# Patient Record
Sex: FEMALE | Race: WHITE
[De-identification: names, ages, dates, MRNs, and addresses within clinical notes are randomized per-mention and may not be internally consistent; named-entity substitution may affect disease eponyms.]

---

## 2019-08-10 ENCOUNTER — HOSPITAL ENCOUNTER (EMERGENCY)
Dept: HOSPITAL 75 - ER FS | Age: 74
Discharge: NURSING, REHAB OR LONG-TERM CARE | End: 2019-08-10
Payer: MEDICARE

## 2019-08-10 VITALS — WEIGHT: 225 LBS | HEIGHT: 61 IN | BODY MASS INDEX: 42.48 KG/M2

## 2019-08-10 VITALS — SYSTOLIC BLOOD PRESSURE: 189 MMHG | DIASTOLIC BLOOD PRESSURE: 59 MMHG

## 2019-08-10 DIAGNOSIS — I50.9: Primary | ICD-10-CM

## 2019-08-10 DIAGNOSIS — Z79.82: ICD-10-CM

## 2019-08-10 DIAGNOSIS — R09.02: ICD-10-CM

## 2019-08-10 DIAGNOSIS — Z88.8: ICD-10-CM

## 2019-08-10 DIAGNOSIS — Z79.02: ICD-10-CM

## 2019-08-10 DIAGNOSIS — F03.90: ICD-10-CM

## 2019-08-10 DIAGNOSIS — Z79.4: ICD-10-CM

## 2019-08-10 LAB
ALBUMIN SERPL-MCNC: 3.9 GM/DL (ref 3.2–4.5)
ALP SERPL-CCNC: 226 U/L (ref 40–136)
ALT SERPL-CCNC: 21 U/L (ref 0–55)
APTT BLD: 26 SEC (ref 24–35)
APTT PPP: (no result) S
BACTERIA #/AREA URNS HPF: (no result) /HPF
BASOPHILS # BLD AUTO: 0 10^3/UL (ref 0–0.1)
BASOPHILS NFR BLD AUTO: 0 % (ref 0–10)
BILIRUB SERPL-MCNC: 1.1 MG/DL (ref 0.1–1)
BILIRUB UR QL STRIP: NEGATIVE
BUN/CREAT SERPL: 18
CALCIUM SERPL-MCNC: 9.3 MG/DL (ref 8.5–10.1)
CHLORIDE SERPL-SCNC: 102 MMOL/L (ref 98–107)
CO2 SERPL-SCNC: 27 MMOL/L (ref 21–32)
CREAT SERPL-MCNC: 0.96 MG/DL (ref 0.6–1.3)
EOSINOPHIL # BLD AUTO: 0.2 10^3/UL (ref 0–0.3)
EOSINOPHIL NFR BLD AUTO: 3 % (ref 0–10)
ERYTHROCYTE [DISTWIDTH] IN BLOOD BY AUTOMATED COUNT: 13.2 % (ref 10–14.5)
FIBRINOGEN PPP-MCNC: (no result) MG/DL
GFR SERPLBLD BASED ON 1.73 SQ M-ARVRAT: 57 ML/MIN
GLUCOSE SERPL-MCNC: 176 MG/DL (ref 70–105)
GLUCOSE UR STRIP-MCNC: NEGATIVE MG/DL
HCT VFR BLD CALC: 46 % (ref 35–52)
HGB BLD-MCNC: 14.7 G/DL (ref 11.5–16)
INR PPP: 1.1 (ref 0.8–1.4)
KETONES UR QL STRIP: NEGATIVE
LEUKOCYTE ESTERASE UR QL STRIP: (no result)
LIPASE SERPL-CCNC: 18 U/L (ref 8–78)
LYMPHOCYTES # BLD AUTO: 1.5 X 10^3 (ref 1–4)
LYMPHOCYTES NFR BLD AUTO: 18 % (ref 12–44)
MAGNESIUM SERPL-MCNC: 1.8 MG/DL (ref 1.8–2.4)
MANUAL DIFFERENTIAL PERFORMED BLD QL: NO
MCH RBC QN AUTO: 31 PG (ref 25–34)
MCHC RBC AUTO-ENTMCNC: 32 G/DL (ref 32–36)
MCV RBC AUTO: 96 FL (ref 80–99)
MONOCYTES # BLD AUTO: 0.4 X 10^3 (ref 0–1)
MONOCYTES NFR BLD AUTO: 4 % (ref 0–12)
NEUTROPHILS # BLD AUTO: 6.5 X 10^3 (ref 1.8–7.8)
NEUTROPHILS NFR BLD AUTO: 75 % (ref 42–75)
NITRITE UR QL STRIP: NEGATIVE
PH UR STRIP: 7.5 [PH] (ref 5–9)
PLATELET # BLD: 272 10^3/UL (ref 130–400)
PMV BLD AUTO: 8.9 FL (ref 7.4–10.4)
POTASSIUM SERPL-SCNC: 4.6 MMOL/L (ref 3.6–5)
PROT SERPL-MCNC: 7.3 GM/DL (ref 6.4–8.2)
PROT UR QL STRIP: (no result)
PROTHROMBIN TIME: 14.6 SEC (ref 12.2–14.7)
RBC #/AREA URNS HPF: (no result) /HPF
SODIUM SERPL-SCNC: 143 MMOL/L (ref 135–145)
SP GR UR STRIP: 1.01 (ref 1.02–1.02)
UROBILINOGEN UR-MCNC: 0.2 MG/DL
WBC # BLD AUTO: 8.7 10^3/UL (ref 4.3–11)

## 2019-08-10 PROCEDURE — 85610 PROTHROMBIN TIME: CPT

## 2019-08-10 PROCEDURE — 83874 ASSAY OF MYOGLOBIN: CPT

## 2019-08-10 PROCEDURE — 87088 URINE BACTERIA CULTURE: CPT

## 2019-08-10 PROCEDURE — 87077 CULTURE AEROBIC IDENTIFY: CPT

## 2019-08-10 PROCEDURE — 87186 SC STD MICRODIL/AGAR DIL: CPT

## 2019-08-10 PROCEDURE — 85025 COMPLETE CBC W/AUTO DIFF WBC: CPT

## 2019-08-10 PROCEDURE — 81000 URINALYSIS NONAUTO W/SCOPE: CPT

## 2019-08-10 PROCEDURE — 80053 COMPREHEN METABOLIC PANEL: CPT

## 2019-08-10 PROCEDURE — 83690 ASSAY OF LIPASE: CPT

## 2019-08-10 PROCEDURE — 84484 ASSAY OF TROPONIN QUANT: CPT

## 2019-08-10 PROCEDURE — 83880 ASSAY OF NATRIURETIC PEPTIDE: CPT

## 2019-08-10 PROCEDURE — 93005 ELECTROCARDIOGRAM TRACING: CPT

## 2019-08-10 PROCEDURE — 71045 X-RAY EXAM CHEST 1 VIEW: CPT

## 2019-08-10 PROCEDURE — 36415 COLL VENOUS BLD VENIPUNCTURE: CPT

## 2019-08-10 PROCEDURE — 85730 THROMBOPLASTIN TIME PARTIAL: CPT

## 2019-08-10 PROCEDURE — 93041 RHYTHM ECG TRACING: CPT

## 2019-08-10 PROCEDURE — 83735 ASSAY OF MAGNESIUM: CPT

## 2019-08-10 NOTE — NUR
Pt up to W/C after use of BSC to void. Pt was also incont of urine before 
acknowledgement of need to void. Staff with pt report pt bumped her SL in Right 
hand and immediate hematoma formed. SL's were being discontinued at this time 
also. Per Jyoti WEBB, she placed a dsg at the IV sites R hand and R AC. Planned 
discharge but awaiting staff to return with W/C and O2. Remains on O2 at 2 L/M 
with SaO2 95%.

## 2019-08-10 NOTE — DIAGNOSTIC IMAGING REPORT
INDICATION:  Chest pain, shortness of breath.



TECHNIQUE:  Single view chest at 9:26 AM.



CORRELATION STUDY:  07/13/2012



FINDINGS: 

Cardiac enlargement stable. Vasculature is prominent with

component of mild edema suggested. Chronic-appearing change about

the lung parenchyma. Component of mild edema is not excluded and

suspect. No definitive consolidating infiltrate. Advanced

degenerative changes of bilateral shoulders.



IMPRESSION: 

1. Cardiac enlargement with suggestion of development of mild

pulmonary vascular congestion from prior study.



Dictated by: 



  Dictated on workstation # [id number]

## 2019-08-10 NOTE — ED CHEST PAIN
General


Stated Complaint:  SOA


Source:  EMS


Exam Limitations:  clinical condition





History of Present Illness


Date Seen by Provider:  Aug 10, 2019


Time Seen by Provider:  09:31


Initial Comments


74-year-old nursing home resident presents with a history of onset of difficulty

breathing and reported chest tightness beginning at 8:30 AM today. She has a 

history of dementia and does not recall the events when questioned. It was 

reported that her O2 sat was decreased into the 80s in the blood pressure was 

normal. She was started on oxygen and by the time EMS arrived her O2 sat was in 

the upper 90s. Her blood pressure was elevated en route to the 200 range. She 

has a valid DO NOT RESUSCITATE form with her. History is very limited due to her

dementia.


Timing/Duration:  1 hour


Severity/Quality:  moderate


Location:  substernal


Radiation:  neck


Activities at Onset:  none


Prior CP/Workup:  no prior cardiac workup


ASA po PTA:  No


NTG SL PTA:  No


Associated Symptoms:  denies symptoms





Allergies and Home Medications


Allergies


Coded Allergies:  


     diazepam (Unverified  Adverse Reaction, Unknown, 8/10/19)





Home Medications


Acetaminophen 500 Mg Tablet, 500-1,000 MG PO Q6HRS MILD PAIN PRN, (Reported)


Aspirin 81 Mg Tabec, 81 MG PO DAILY, (Reported)


Atorvastatin Calcium 40 Mg Tablet, 40 MG PO DAILY, (Reported)


Benazepril Hcl 20 Mg Tablet, 20 MG PO BID, (Reported)


Clopidogrel Bisulfate 75 Mg Tablet, 75 MG PO DAILY, (Reported)


Clopidogrel Bisulfate 75 Mg Tablet, 75 MG PO DAILY, (Reported)


Docusate Sodium 100 Mg Capsule, 100 MG PO BID, (Reported)


Furosemide 40 Mg Tablet, 40 MG PO DAILY, (Reported)


Gabapentin 100 Mg Capsule, 100 MG PO TID, (Reported)


Glimepiride 2 Mg Tab, 2 MG PO DAILY, (Reported)


Insulin Determir 1,000 Units/10 Ml Soln, 60 UNITS SQ HS, (Reported)


Insulin Determir 1,000 Units/10 Ml Soln, 50 UNITS SQ DAILY, (Reported)


Magnesium Hydroxide 400 Mg/5 Ml Oral.susp, 2,400 MG PO DAILY, (Reported)


Metoprolol Tartrate 100 Mg Tablet, 100 MG PO BID, (Reported)


Pantoprazole Sodium 20 Mg Tablet.dr, 20 MG PO DAILY, (Reported)


Paroxetine HCl 30 Mg Tablet, 30 MG PO DAILY, (Reported)


Potassium Chloride 10 Meq Tablet.er, 10 MEQ PO DAILY, (Reported)


Sulfamethoxazole/Trimethoprim 1 Each Tablet, 1 EACH PO BID, (Reported)





Patient Home Medication List


Home Medication List Reviewed:  Yes





Review of Systems


Review of Systems


Constitutional:  no symptoms reported


EENTM:  No Symptoms Reported


Respiratory:  See HPI


Cardiovascular:  See HPI


Gastrointestinal:  No Symptoms Reported


Genitourinary:  No Symptoms Reported


Musculoskeletal:  no symptoms reported


Skin:  no symptoms reported


Psychiatric/Neurological:  No Symptoms Reported


Endocrine:  No Symptoms Reported


Hematologic/Lymphatic:  No Symptoms Reported





Past Medical-Social-Family Hx


Past Med/Social Hx:  Reviewed Nursing Past Med/Soc Hx


Immunizations Up To Date


Date of Influenza Vaccine:  Oct 1, 2011





Past Medical History


Reproductive Disorders:  No





Physical Exam


Vital Signs





Vital Signs - First Documented




















Capillary Refill :


Height, Weight, BMI


Height: '"


Weight: lbs. oz. kg;  BMI


Method:


General Appearance:  No Apparent Distress, WD/WN


HEENT:  PERRL/EOMI, TMs Normal, Normal ENT Inspection, Other (edentulous)


Neck:  Full Range of Motion, Normal Inspection, Non Tender, Supple


Respiratory:  Chest Non Tender, Lungs Clear, No Accessory Muscle Use, No 

Respiratory Distress, Decreased Breath Sounds


Cardiovascular:  Regular Rate, Rhythm, No Edema, No Gallop, No JVD, No Murmur, 

Normal Peripheral Pulses


Gastrointestinal:  Normal Bowel Sounds, No Organomegaly, No Pulsatile Mass, Non 

Tender, Soft


Extremity:  Normal Capillary Refill, Normal Inspection, Normal Range of Motion, 

Non Tender, No Calf Tenderness, No Pedal Edema


Neurologic/Psychiatric:  Alert, No Motor/Sensory Deficits, Normal Mood/Affect, 

CNs II-XII Norm as Tested, Disoriented


Skin:  Normal Color, Warm/Dry


Lymphatic:  No Adenopathy





Progress/Results/Core Measures


Results/Orders


Lab Results





Laboratory Tests








Test


 8/10/19


09:37 Range/Units


 


 


White Blood Count


 8.7 


 4.3-11.0


10^3/uL


 


Red Blood Count


 4.76 


 4.35-5.85


10^6/uL


 


Hemoglobin 14.7  11.5-16.0  G/DL


 


Hematocrit 46  35-52  %


 


Mean Corpuscular Volume 96  80-99  FL


 


Mean Corpuscular Hemoglobin 31  25-34  PG


 


Mean Corpuscular Hemoglobin


Concent 32 


 32-36  G/DL





 


Red Cell Distribution Width 13.2  10.0-14.5  %


 


Platelet Count


 272 


 130-400


10^3/uL


 


Mean Platelet Volume 8.9  7.4-10.4  FL


 


Neutrophils (%) (Auto) 75  42-75  %


 


Lymphocytes (%) (Auto) 18  12-44  %


 


Monocytes (%) (Auto) 4  0-12  %


 


Eosinophils (%) (Auto) 3  0-10  %


 


Basophils (%) (Auto) 0  0-10  %


 


Neutrophils # (Auto) 6.5  1.8-7.8  X 10^3


 


Lymphocytes # (Auto) 1.5  1.0-4.0  X 10^3


 


Monocytes # (Auto) 0.4  0.0-1.0  X 10^3


 


Eosinophils # (Auto)


 0.2 


 0.0-0.3


10^3/uL


 


Basophils # (Auto)


 0.0 


 0.0-0.1


10^3/uL


 


Prothrombin Time 14.6  12.2-14.7  SEC


 


INR Comment 1.1  0.8-1.4  


 


Activated Partial


Thromboplast Time 26 


 24-35  SEC





 


Sodium Level 143  135-145  MMOL/L


 


Potassium Level 4.6  3.6-5.0  MMOL/L


 


Chloride Level 102    MMOL/L


 


Carbon Dioxide Level 27  21-32  MMOL/L


 


Anion Gap 14  5-14  MMOL/L


 


Blood Urea Nitrogen 17  7-18  MG/DL


 


Creatinine


 0.96 


 0.60-1.30


MG/DL


 


Estimat Glomerular Filtration


Rate 57 


  





 


BUN/Creatinine Ratio 18   


 


Glucose Level 176 H   MG/DL


 


Calcium Level 9.3  8.5-10.1  MG/DL


 


Corrected Calcium 9.4  8.5-10.1  MG/DL


 


Magnesium Level 1.8  1.8-2.4  MG/DL


 


Total Bilirubin 1.1 H 0.1-1.0  MG/DL


 


Aspartate Amino Transf


(AST/SGOT) 31 


 5-34  U/L





 


Alanine Aminotransferase


(ALT/SGPT) 21 


 0-55  U/L





 


Alkaline Phosphatase 226 H   U/L


 


Myoglobin


 53.9 


 10.0-92.0


NG/ML


 


Troponin I < 0.30  <0.30  NG/ML


 


Pro-B-Type Natriuretic Peptide 1445.0 H <75.0  PG/ML


 


Total Protein 7.3  6.4-8.2  GM/DL


 


Albumin 3.9  3.2-4.5  GM/DL


 


Lipase 18  8-78  U/L








My Orders





Orders - AUSTIN SARAVIA MD


Cbc With Automated Diff (8/10/19 09:29)


Magnesium (8/10/19 09:29)


Chest 1 View Ap/Pa Only (8/10/19 09:29)


Ekg Tracing (8/10/19 09:29)


Comprehensive Metabolic Panel (8/10/19 09:)


Myoglobin Serum (8/10/19 09:)


Protime With Inr (8/10/19 09:)


Partial Thromboplastin Time (8/10/19 09:)


O2 (8/10/19 09:)


Monitor-Rhythm Ecg Trace Only (8/10/19 09:)


Lipid Panel (19 06:00)


Aspirin Chewable Tablet (Baby Aspirin Ch (8/10/19 09:30)


Ed Iv/Invasive Line Start (8/10/19 09:29)


Lipase (8/10/19 09:29)


Troponin I (8/10/19 09:29)


Probnp Fs (8/10/19 09:29)


Ua Culture If Indicated (8/10/19 09:41)


Furosemide  Injection (Lasix  Injection) (8/10/19 10:30)





Medications Given in ED





Current Medications








 Medications  Dose


 Ordered  Sig/Johnson


 Route  Start Time


 Stop Time Status Last Admin


Dose Admin


 


 Aspirin  324 mg  ONCE  ONCE


 PO  8/10/19 09:30


 8/10/19 09:31 DC 8/10/19 09:41


324 MG


 


 Furosemide  40 mg  ONCE  ONCE


 IVP  8/10/19 10:30


 8/10/19 10:32 DC 8/10/19 10:29


40 MG








Vital Signs/I&O











 8/10/19 8/10/19





 09:20 09:20


 


Temp 97.6 


 


Pulse 72 


 


Resp 24 


 


B/P (MAP) 199/103 (135) 


 


Pulse Ox 96 96


 


O2 Delivery Nasal Cannula Nasal Cannula


 


O2 Flow Rate 3.00 3.00











Progress


Progress Note :  


   Time:  10:25


Progress Note


Patient resting comfortably. Troponin negative. BNP and chest x-ray indicate 

fluid overload. We'll give Lasix. Patient's son confirms DO NOT RESUSCITATE 

status and limited treatment.





1112 Requiring low flow O2 to maintain adequate saturation.  She has an order 

and uses O2 PRN at NH.  She is feeling better after Lasix.  Note is made of 

recent urine culture sensitive to Bactrim which she is taking.  With negative 

troponin and clinical improvement with O2 and Lasix, she can likely to return to

NH for continued residential care.  Son to return soon.





1125 Discussed with son who prefers that pt go back to NH with O2 and Lasix.  

Will facilitate his wishes.


Initial ECG Impression Date:  Aug 10, 2019


Initial ECG Impression Time:  09:32


Initial ECG Rate:  68


Initial ECG Rhythm:  Normal Sinus


Initial ECG Intervals:  QT (461)


Comment


incomplete LBBB, LVH.  No acute changes noted. .PANTERA





Diagnostic Imaging





   Diagonstic Imaging:  Xray


   Plain Films/CT/US/NM/MRI:  chest


Comments


NAME:   THEA MORRISON


MED REC#:   [id number]


ACCOUNT#:   [id number]


PT STATUS:   REG ER


:   1945


PHYSICIAN:   AUSTIN SARAVIA MD


ADMIT DATE:   08/10/19/ER FS


***Draft***


Date of Exam:08/10/19





CHEST 1 VIEW AP/PA ONLY








INDICATION:  Chest pain, shortness of breath.





TECHNIQUE:  Single view chest at 9:26 AM.





CORRELATION STUDY:  2012





FINDINGS: 


Cardiac enlargement stable. Vasculature is prominent with


component of mild edema suggested. Chronic-appearing change about


the lung parenchyma. Component of mild edema is not excluded and


suspect. No definitive consolidating infiltrate. Advanced


degenerative changes of bilateral shoulders.





IMPRESSION: 


1. Cardiac enlargement with suggestion of development of mild


pulmonary vascular congestion from prior study.





  Dictated on workstation # [id number]








Dict:   08/10/19 0952


Trans:   08/10/19 0956


KB [id number]





Interpreted by:     EVELYNE ROMAN DO


Electronically signed by:





Departure


Impression





   Primary Impression:  


   CHF (congestive heart failure)


   Qualified Codes:  I50.9 - Heart failure, unspecified


   Additional Impressions:  


   Hypoxia


   Dementia


   Qualified Codes:  G30.1 - Alzheimer's disease with late onset; F02.80 - 

   Dementia in other diseases classified elsewhere without behavioral 

   disturbance


Disposition:  63 DISC/XFER TO IRF


Condition:  Improved





Departure-Patient Inst.


Decision time for Depature:  11:26


Referrals:  


AUSTIN WELCH MD (PCP/Family)


Primary Care Physician


Patient Instructions:  Heart Failure, Adult (DC)





Add. Discharge Instructions:  


Start Lasix 40mg daily PRN in addition to regular dose.  Continue oxygen at 2 

liters as needed to maintain sats above 92% (already ordered at NH).


Scripts


Furosemide (Furosemide) 20 Mg Tablet


40 MG PO DAILY PRN for AIR HUNGER for 30 Days, #30 TAB


   Prov: AUSTIN SARAVIA MD         8/10/19











AUSTIN SARAVIA MD                 Aug 10, 2019 09:35

## 2019-08-10 NOTE — NUR
Lasix 40 mg began per mini-infusor as prescribed. Pt reports knowing sometimes 
when she has to void. Pt is wearing a diaper. Call light placed in reach.

## 2019-08-10 NOTE — NUR
Called report to Andre WEBB at [facility], request transportation for transfer 
back.

## 2019-08-10 NOTE — NUR
Pt discharged at this time from ER via W/C with O2 on in care of [facility] 
staff. Envelopes of pt records/dismissal instructions/add'l Lasix order on Dr 
order form, and copies of tests. UA has resulted and sent.

## 2019-08-10 NOTE — XMS REPORT
Continuity of Care Document

                             Created on: 08/10/2019



Antonella Kuo

External Reference #: [id number]

: 1945

Sex: Female



Demographics







                          Address                   [address]

[place]  [address]

 

                          Home Phone                [phone] x

 

                          Preferred Language        Unknown

 

                          Marital Status            Unknown

 

                          Rastafarian Affiliation     Unknown

 

                          Race                      Unknown

 

                          Ethnic Group              Unknown





Author







                          Organization              Unknown

 

                          Address                   Unknown

 

                          Phone                     Unavailable



              



Allergies

      



There is no data.                  



Medications

      



There is no data.                  



Problems

      



There is no data.                  



Procedures

      



There is no data.                  



Results

      





                    Test                Result              Range        









                                        A1C - 19 12:11         









                    HEMOGLOBIN A1c              7.3 % of total Hgb              <5.7        









                                        CULTURE, URINE - 19 13:25         









                    CULTURE, URINE, ROUTINE              SEE NOTE               NRG        



                  



Encounters

      





                ACCT No.              Visit Date/Time              Discharge              Status      

                Pt. Type              Provider              Facility              Loc./Unit      

                                        Complaint        

 

                [id number]              2019 16:45:00              2019 23:59:59              

[facility]              Outpatient                                              [facility] 

[facility]                                             

 

             [id number]              2019 16:45:00                                            Document

 Registration                                                                       

 

             [id number]              2019 17:45:00                                            Document

 Registration

## 2019-11-22 ENCOUNTER — HOSPITAL ENCOUNTER (OUTPATIENT)
Dept: HOSPITAL 75 - RAD | Age: 74
End: 2019-11-22
Attending: PEDIATRICS
Payer: MEDICARE

## 2019-11-22 DIAGNOSIS — M47.816: Primary | ICD-10-CM

## 2019-11-22 DIAGNOSIS — M46.86: ICD-10-CM

## 2019-11-22 DIAGNOSIS — M85.88: ICD-10-CM

## 2019-11-22 DIAGNOSIS — Z98.1: ICD-10-CM

## 2019-11-22 DIAGNOSIS — I70.90: ICD-10-CM

## 2019-11-22 PROCEDURE — 72100 X-RAY EXAM L-S SPINE 2/3 VWS: CPT

## 2019-11-22 NOTE — DIAGNOSTIC IMAGING REPORT
HISTORY: Left-sided low back pain with sciatica.



TECHNIQUE: Three views of the lumbar spine.



COMPARISON: None.



FINDINGS:



There is diffuse osteopenia. There are postsurgical changes in

the lumbar spine with posterior fusion of L2-L3 with unilateral

left pedicle screws and posterior fusion aline. Laminectomy changes

are seen throughout the lumbar spine. There appears to be osseous

fusion posteriorly. Marked degenerative changes are seen

throughout the lumbar spine, most pronounced at L1-L2 and L3-L4.

There is marked facet arthropathy in the lower lumbar spine.

There does appear to be transitional anatomy at the lumbosacral

junction. There is calcific atherosclerosis.



IMPRESSION:

1. Advanced degenerative changes in the lumbar spine, most

pronounced at L1-L2 and L3-L4, with no acute fracture seen.

2. Postsurgical changes in the lumbar spine as described above.



Dictated by: 



  Dictated on workstation # [id number]

## 2020-07-12 ENCOUNTER — HOSPITAL ENCOUNTER (OUTPATIENT)
Dept: HOSPITAL 75 - ER FS | Age: 75
Setting detail: OBSERVATION
Discharge: HOME | End: 2020-07-12
Attending: INTERNAL MEDICINE | Admitting: INTERNAL MEDICINE
Payer: MEDICARE

## 2020-07-12 VITALS — WEIGHT: 186.95 LBS | BODY MASS INDEX: 43.27 KG/M2 | HEIGHT: 55 IN

## 2020-07-12 VITALS — DIASTOLIC BLOOD PRESSURE: 75 MMHG | SYSTOLIC BLOOD PRESSURE: 157 MMHG

## 2020-07-12 VITALS — SYSTOLIC BLOOD PRESSURE: 139 MMHG | DIASTOLIC BLOOD PRESSURE: 55 MMHG

## 2020-07-12 VITALS — SYSTOLIC BLOOD PRESSURE: 150 MMHG | DIASTOLIC BLOOD PRESSURE: 89 MMHG

## 2020-07-12 VITALS — DIASTOLIC BLOOD PRESSURE: 76 MMHG | SYSTOLIC BLOOD PRESSURE: 140 MMHG

## 2020-07-12 VITALS — DIASTOLIC BLOOD PRESSURE: 66 MMHG | SYSTOLIC BLOOD PRESSURE: 172 MMHG

## 2020-07-12 VITALS — SYSTOLIC BLOOD PRESSURE: 189 MMHG | DIASTOLIC BLOOD PRESSURE: 74 MMHG

## 2020-07-12 VITALS — DIASTOLIC BLOOD PRESSURE: 52 MMHG | SYSTOLIC BLOOD PRESSURE: 128 MMHG

## 2020-07-12 DIAGNOSIS — Z95.5: ICD-10-CM

## 2020-07-12 DIAGNOSIS — I25.10: ICD-10-CM

## 2020-07-12 DIAGNOSIS — I50.9: ICD-10-CM

## 2020-07-12 DIAGNOSIS — Z79.899: ICD-10-CM

## 2020-07-12 DIAGNOSIS — M19.90: ICD-10-CM

## 2020-07-12 DIAGNOSIS — F32.9: ICD-10-CM

## 2020-07-12 DIAGNOSIS — Z86.73: ICD-10-CM

## 2020-07-12 DIAGNOSIS — I11.0: ICD-10-CM

## 2020-07-12 DIAGNOSIS — F41.9: ICD-10-CM

## 2020-07-12 DIAGNOSIS — K21.9: ICD-10-CM

## 2020-07-12 DIAGNOSIS — Z88.8: ICD-10-CM

## 2020-07-12 DIAGNOSIS — Z79.82: ICD-10-CM

## 2020-07-12 DIAGNOSIS — E78.5: ICD-10-CM

## 2020-07-12 DIAGNOSIS — E11.9: ICD-10-CM

## 2020-07-12 DIAGNOSIS — R00.1: Primary | ICD-10-CM

## 2020-07-12 DIAGNOSIS — E78.00: ICD-10-CM

## 2020-07-12 DIAGNOSIS — Z79.4: ICD-10-CM

## 2020-07-12 DIAGNOSIS — F03.90: ICD-10-CM

## 2020-07-12 DIAGNOSIS — N39.0: ICD-10-CM

## 2020-07-12 LAB
ALBUMIN SERPL-MCNC: 3.8 GM/DL (ref 3.2–4.5)
ALP SERPL-CCNC: 196 U/L (ref 40–136)
ALT SERPL-CCNC: 24 U/L (ref 0–55)
APTT BLD: 27 SEC (ref 24–35)
BASOPHILS # BLD AUTO: 0 10^3/UL (ref 0–0.1)
BASOPHILS NFR BLD AUTO: 0 % (ref 0–10)
BILIRUB SERPL-MCNC: 0.9 MG/DL (ref 0.1–1)
BUN/CREAT SERPL: 20
CALCIUM SERPL-MCNC: 9.5 MG/DL (ref 8.5–10.1)
CHLORIDE SERPL-SCNC: 102 MMOL/L (ref 98–107)
CO2 SERPL-SCNC: 33 MMOL/L (ref 21–32)
CREAT SERPL-MCNC: 0.98 MG/DL (ref 0.6–1.3)
EOSINOPHIL # BLD AUTO: 0.2 10^3/UL (ref 0–0.3)
EOSINOPHIL NFR BLD AUTO: 2 % (ref 0–10)
ERYTHROCYTE [DISTWIDTH] IN BLOOD BY AUTOMATED COUNT: 13.1 % (ref 10–14.5)
GFR SERPLBLD BASED ON 1.73 SQ M-ARVRAT: 55 ML/MIN
GLUCOSE SERPL-MCNC: 62 MG/DL (ref 70–105)
HCT VFR BLD CALC: 46 % (ref 35–52)
HGB BLD-MCNC: 14.9 G/DL (ref 11.5–16)
INR PPP: 0.9 (ref 0.8–1.4)
LYMPHOCYTES # BLD AUTO: 2.2 X 10^3 (ref 1–4)
LYMPHOCYTES NFR BLD AUTO: 22 % (ref 12–44)
MANUAL DIFFERENTIAL PERFORMED BLD QL: NO
MCH RBC QN AUTO: 31 PG (ref 25–34)
MCHC RBC AUTO-ENTMCNC: 32 G/DL (ref 32–36)
MCV RBC AUTO: 96 FL (ref 80–99)
MONOCYTES # BLD AUTO: 0.5 X 10^3 (ref 0–1)
MONOCYTES NFR BLD AUTO: 5 % (ref 0–12)
NEUTROPHILS # BLD AUTO: 7.1 X 10^3 (ref 1.8–7.8)
NEUTROPHILS NFR BLD AUTO: 70 % (ref 42–75)
PLATELET # BLD: 304 10^3/UL (ref 130–400)
PMV BLD AUTO: 8.8 FL (ref 7.4–10.4)
POTASSIUM SERPL-SCNC: 4.4 MMOL/L (ref 3.6–5)
PROT SERPL-MCNC: 6.7 GM/DL (ref 6.4–8.2)
PROTHROMBIN TIME: 12.3 SEC (ref 12.2–14.7)
SODIUM SERPL-SCNC: 144 MMOL/L (ref 135–145)
WBC # BLD AUTO: 10.1 10^3/UL (ref 4.3–11)

## 2020-07-12 PROCEDURE — 93005 ELECTROCARDIOGRAM TRACING: CPT

## 2020-07-12 PROCEDURE — 84484 ASSAY OF TROPONIN QUANT: CPT

## 2020-07-12 PROCEDURE — 85730 THROMBOPLASTIN TIME PARTIAL: CPT

## 2020-07-12 PROCEDURE — 85610 PROTHROMBIN TIME: CPT

## 2020-07-12 PROCEDURE — 93306 TTE W/DOPPLER COMPLETE: CPT

## 2020-07-12 PROCEDURE — 36415 COLL VENOUS BLD VENIPUNCTURE: CPT

## 2020-07-12 PROCEDURE — 80053 COMPREHEN METABOLIC PANEL: CPT

## 2020-07-12 PROCEDURE — 85025 COMPLETE CBC W/AUTO DIFF WBC: CPT

## 2020-07-12 PROCEDURE — 71045 X-RAY EXAM CHEST 1 VIEW: CPT

## 2020-07-12 PROCEDURE — 82962 GLUCOSE BLOOD TEST: CPT

## 2020-07-12 PROCEDURE — 83880 ASSAY OF NATRIURETIC PEPTIDE: CPT

## 2020-07-12 NOTE — CONSULTATION-CARDIOLOGY
HPI-Cardiology


Cardiology Consultation:


Date of Consultation


20


Date of Admission





Attending Physician


Kayla Hercules MD


Admitting Physician


Austin Bustillo MD


Consulting Physician


AYDEN DOMINGUEZ MD





HPI:


Time Seen by a Provider:  14:00


Chief Complaint:


Bradycardia


This is a 75-year-old lady who has history of hypertension, CAD, heart failure, 

dementia, diabetes, hyperlipidemia.  She presented to the hospital for complaint

of bradycardia.  She is a nursing home resident.  Poor historian due to 

dementia.  No active cardiac complaints.  She denied dizziness, near-syncope or 

syncope.  She was on metoprolol as an outpatient.  No active smoking.  Pertinent

family history is negative.





Review of Systems-Cardiology


Review of Systems


Constitutional:  As described under HPI; No As described under HPI, No no 

symptoms reported, No chills, No fever, No lightheadedness


Eyes:  No As described under HPI, No no symptoms reported, No blindness, No 

blurred vision, No contact lenses, No drainage, No decreased acuity, No foreign 

body sensation, No pain, No vision change


Ears/Nose/Throat:  No As described under HPI, No no symptoms reported, No 

jose alejandro hearing loss, No ear discharge, No ear pain, No nasal drainage, No 

ulcerations


Respiratory:  No no symptoms reported; As described under HPI; No As described 

under HPI, No cough, No orthopnea, No shortness of breath, No SOB with excertion


Cardiovascular:  No no symptoms reported; As described under HPI; No As 

described under HPI, No chest pain, No edema, No irregular heart rate, No 

lightheadedness, No palpitations


Gastrointestinal:  No no symptoms reported, No As described under HPI, No 

abdomen distended, No abdominal pain, No blood streaked bowels, No constipation,

No diarrhea, No nausea, No vomiting, No stool coloration changes


Genitourinary:  No As described under HPI, No burning, No dysuria, No discharge,

No frequency, No flank pain, No hematuria, No urgency


Pregnant:  Yes


Pregnant:  No


Skin:  No rash, No skin related problems, No ulcerations


Psychiatric/Neurological:  No anxiety, No depression, No seizure, No focal 

weakness, No syncope


Hematologic:  No bleeding abnormalities





All Other Systems Reviewed


Negative Unless Noted:  Yes (Negative excepted noted.)





PMH-Social-Family Hx


Patient Social History


Recent Foreign Travel:  No


Recent Infectious Disease Expo:  No


Hospitalization with Isolation:  Denies





Immunizations Up To Date


Date of Influenza Vaccine:  Oct 1, 2011





Past Medical History


PMH


As described under Assessment.





Family Medical History


Family History:  


Patient reports no known family medical history.





Allergies and Home Medications


Allergies


Coded Allergies:  


     diazepam (Unverified  Adverse Reaction, Unknown, 8/10/19)





Home Medications


Acetaminophen 500 Mg Tablet, 500-1,000 MG PO Q6HRS MILD PAIN PRN, (Reported)


Amlodipine Besylate 5 Mg Tablet, 5 MG PO DAILY


   Prescribed by: MARIBEL CASTILLO on 20 1602


Aspirin 81 Mg Tabec, 81 MG PO DAILY, (Reported)


Atorvastatin Calcium 40 Mg Tablet, 40 MG PO DAILY, (Reported)


Benazepril Hcl 20 Mg Tablet, 20 MG PO BID, (Reported)


Clopidogrel Bisulfate 75 Mg Tablet, 75 MG PO DAILY, (Reported)


Docusate Sodium 100 Mg Capsule, 100 MG PO BID, (Reported)


Furosemide 40 Mg Tablet, 40 MG PO DAILY, (Reported)


Furosemide 20 Mg Tablet, 40 MG PO DAILY PRN for AIR HUNGER


   Prescribed by: AUSTIN SARAVIA on 8/10/19 1128


Gabapentin 100 Mg Capsule, 100 MG PO TID, (Reported)


Glimepiride 2 Mg Tab, 2 MG PO DAILY, (Reported)


Insulin Determir 1,000 Units/10 Ml Soln, 60 UNITS SQ HS, (Reported)


Insulin Determir 1,000 Units/10 Ml Soln, 50 UNITS SQ DAILY, (Reported)


Magnesium Hydroxide 400 Mg/5 Ml Oral.susp, 2,400 MG PO DAILY, (Reported)


Pantoprazole Sodium 20 Mg Tablet.dr, 20 MG PO DAILY, (Reported)


Paroxetine HCl 30 Mg Tablet, 30 MG PO DAILY, (Reported)


Potassium Chloride 10 Meq Tablet.er, 10 MEQ PO DAILY, (Reported)





Patient Home Medication List


Home Medication List Reviewed:  Yes





Physical Exam-Cardiology


Physical Exam


Vital Signs/I&O


Capillary Refill : Less Than 3 Seconds


Constitutional:  appears stated age; No apparent distress; well-developed, well-

nourished


HEENT:  PERRL; No discharge; hearing is well preserved, oral hygience is good; 

No ulceration, No xanthelasmas are seen


Neck:  No carotid bruit; carotid pulses are 2 + bilaterally


Respiratory:  chest is bilaterally symmetric, lungs clear to auscultation


Cardiovascular:  regular rate-rhythm, bradycardia, S1 and S2


Gastrointestinal:  soft, audible bowel sounds; No spleenomegaly


Rectal:  deferred


Extremities:  normal range of motion, non-tender, normal inspection; No 

clubbing, No cyanosis; no lower extremity edema bilateral; No significant edema


Neurologic/Psychiatric:  no motor/sensory deficits, alert, normal mood/affect, 

oriented x 3, power is 5/5 both on sides


Skin:  normal color, warm/dry; No rash, No ulcerations





Data Review


Labs








ECG Impression


ECG


Initial ECG Rhythm:  S.Paulo





A/P-Cardiology


Assessment/Admission Diagnosis


Sinus bradycardia,


CAD, 


hypertension,


Hyperlipidemia,


Diabetes,


Dementia





Plan


DC metoprolol.  Significantly improved heart rate in the 50s and early 60s.


Hypertension: Already on lisinopril 40 mg daily.  Add amlodipine 5 mg daily.


CAD, stable with no active issues.  Continue outpatient medical therapy.


History of congestive heart failure: No florid congestive heart failure on 

examination.


Dementia, defer to the primary team.


Diabetes, deferred to the primary team.


Hyperlipidemia, continue statin therapy.


Okay to DC to the nursing home to follow with primary care physician.








Thank you for your consultation. Please call me if you have any questions.








MATT Dominguez MD, FACP, FACC, FSCAI, FHRS, CCDS


Interventional Cardiology


Cardiac Electrophysiology


Vascular Medicine and Endovascular Interventions





Clinical Quality Measures


DVT/VTE Risk/Contraindication:


Risk Factor Score Per Nursin


RFS Level Per Nursing on Admit:  2=Moderate











AYDEN DOMINGUEZ MD             2020 15:16

## 2020-07-12 NOTE — NUR
ATTEMPTED TO GIVE REPORT TO JALEN BUT SHE WENT TO THE ER TO GET ANOTHER PT 
AND THIS RN WAS INFORMED THAT SHE WOULD CALL [facility] TO GET REPORT.

## 2020-07-12 NOTE — HISTORY & PHYSICAL-HOSPITALIST
History of Present Illness


HPI/Chief Complaint


Antonella Kuo is a 75-year-old female with PMH HTN, CAD, heart failure, dementia, 

T2DM, HLD, who presented with bradycardia.  She is a resident of [facility].  She is a poor historian due to her dementia.  She was not reporting

any lightheadedness or dizziness.  She denies any chest pain.  She denies any 

shortness of breath.


Source:  patient, RN/MD


Date Seen


20


Time Seen by a Provider:  09:30


Attending Physician


David Payne MD


PCP


Zack Bustillo MD


Referring Physician





Date of Admission


2020 at 06:24





Home Medications & Allergies


Home Medications


Reviewed patient Home Medication Reconciliation performed by pharmacy medication

reconciliations technician and/or nursing.


Patients Allergies have been reviewed.





Allergies





Allergies


Coded Allergies


  diazepam (Unverified Adverse Reaction, Unknown, 8/10/19)








Past Medical-Social-Family Hx


Past Med/Social Hx:  Reviewed Nursing Past Med/Soc Hx


Patient Social History


Recent Foreign Travel:  No


Contact w/other who traveled:  No


Recent Hopitalizations:  No


Recent Infectious Disease Expo:  No





Immunizations Up To Date


Date of Influenza Vaccine:  Oct 1, 2011





Seasonal Allergies


Seasonal Allergies:  No





Past Medical History


Surgeries:  Gallbladder, Joint Replacement, Orthopedic


Cardiac:  High Cholesterol, Hypertension


Neurological:  Stroke


Reproductive:  No


Genitourinary:  UTI-Chronic


Gastrointestinal:  Gastroesophageal Reflux


Musculoskeletal:  Arthritis


Endocrine:  Diabetes, Insulin dep


Psychosocial:  Anxiety, Depression


History of Blood Disorders:  No





Family History


Reviewed Nursing Family Hx





Patient reports no known family medical history.





Review of Systems


Constitutional:  no symptoms reported, see HPI





Physical Exam


Physical Exam


Vital Signs





Vital Signs - First Documented








 20





 03:53 05:00


 


Temp 35.3 


 


Pulse 50 


 


Resp 16 


 


B/P (MAP) 167/74 (105) 


 


Pulse Ox  100


 


O2 Delivery Nasal Cannula 


 


O2 Flow Rate 2.00 





Capillary Refill : Less Than 3 Seconds


Height, Weight, BMI


Height: 5'1.00"


Weight: 225lbs. 0oz. [id number]; 207.03 BMI


Method:Estimated


General Appearance:  No Apparent Distress, Obese


HEENT:  PERRL/EOMI, Pharynx Normal


Neck:  Normal Inspection, Supple


Respiratory:  Lungs Clear, Normal Breath Sounds, No Respiratory Distress


Cardiovascular:  Regular Rate, Rhythm, No Edema, No Murmur


Gastrointestinal:  Normal Bowel Sounds, Non Tender, Soft


Extremity:  Normal Inspection, Non Tender, No Pedal Edema


Neurologic/Psychiatric:  Alert, No Motor/Sensory Deficits, Normal Mood/Affect, 

Disoriented


Skin:  Normal Color, Warm/Dry





Results


Results/Procedures


Labs


Laboratory Tests


20 04:09








Patient resulted labs reviewed.


Imaging:  Reviewed Imaging Report





Assessment/Plan


Admission Diagnosis


Bradycardia


Admission Status:  Observation





Assessment and Plan


Bradycardia


Dementia


Hypertension


CAD


Chronic CHF


Diabetes mellitus


Hyperlipidemia


Likely drug induced


Chronically on metoprolol 100 mg twice daily


Hold beta blockers, continue other antihypertensives


Asymptomatic, though poor historian due to dementia


Monitor on telemetry


Cardiology consulted, appreciate assistance





DVT prophylaxis: Lovenox





Diagnosis/Problems


Diagnosis/Problems





(1) Bradycardia


Status:  Acute





Clinical Quality Measures


DVT/VTE Risk/Contraindication:


Risk Factor Score Per Nursin


RFS Level Per Nursing on Admit:  2=Moderate











DAVID PAYNE MD              2020 11:48

## 2020-07-12 NOTE — XMS REPORT
Continuity of Care Document

                             Created on: 2020



Antonella Kuo

External Reference #: [id number]

: 1945

Sex: Female



Demographics





                          Address                   [address] [place]  [address]

 

                          Home Phone                [phone] x

 

                          Preferred Language        Unknown

 

                          Marital Status            Unknown

 

                          Religion Affiliation     Unknown

 

                          Race                      Unknown

 

                          Ethnic Group              Unknown





Author





                          Organization              Unknown

 

                          Address                   Unknown

 

                          Phone                     Unavailable



              



Allergies

      



             Active           Description           Code           Type         

  Severity   

                Reaction           Onset           Reported/Identified          

 

Relationship to Patient                 Clinical Status        

 

                Yes             No Known Drug Allergies           [id number]    

       Drug 

Allergy           Unknown           N/A                             2012  

      

                                                             

 

             Yes           diazepam           [id number]           Drug Allergy 

          

Unknown           N/A                        08/10/2019                         

  

     



                    



Medications

      



There is no data.                  



Problems

      



             Date Dx Coded           Attending           Type           Code    

       

Diagnosis                               Diagnosed By        

 

             2019           AUSTIN WELCH MD, Ot           N39.0   

        

URINARY TRACT INFECTION, SITE NOT SPECIF                    

 

             08/10/2019           AUSTIN SARAVIA MD, Ot           F03.90 

          

UNSPECIFIED DEMENTIA WITHOUT BEHAVIORAL                     

 

             08/10/2019           AUSTIN SARAVIA MD, Ot           I50.9  

         

HEART FAILURE, UNSPECIFIED                       

 

             08/10/2019           AUSTIN SARAVIA MD           Ot           R09.02 

          

HYPOXEMIA                                        

 

             08/10/2019           AUSTIN SARAVIA MD           Ot           Z79.02 

          

LONG TERM (CURRENT) USE OF ANTITHROMBOTI                    

 

             08/10/2019           AUSTIN SARAVIA MD           Ot           Z79.4  

         LONG

TERM (CURRENT) USE OF INSULIN                    

 

             08/10/2019           AUSTIN SARAVIA MD           Ot           Z79.82 

          

LONG TERM (CURRENT) USE OF ASPIRIN                    

 

             08/10/2019           AUSTIN SARAVIA MD           Ot           Z88.8  

         

ALLERGY STATUS TO [facility] DRUG/MEDS/BIOL SUB                    

 

             08/15/2019           AUSTIN SARAVIA MD, Ot           F03.90 

          

UNSPECIFIED DEMENTIA WITHOUT BEHAVIORAL                     

 

             08/15/2019           AUSTIN SARAVIA MD, Ot           I50.9  

         

HEART FAILURE, UNSPECIFIED                       

 

             08/15/2019           AUSTIN SARAVIA MD           Ot           R09.02 

          

HYPOXEMIA                                        

 

             08/15/2019           AUSTIN SARAVIA MD           Ot           Z79.02 

          

LONG TERM (CURRENT) USE OF ANTITHROMBOTI                    

 

             08/15/2019           AUSTIN SARAVIA MD, Ot           Z79.4  

         LONG

TERM (CURRENT) USE OF INSULIN                    

 

             08/15/2019           AUSTIN SARAVIA MD           Ot           Z79.82 

          

LONG TERM (CURRENT) USE OF ASPIRIN                    

 

             08/15/2019           AUSTIN SARAVIA MD           Ot           Z88.8  

         

ALLERGY STATUS TO OTH DRUG/MEDS/BIOL SUB                    

 

             2019           AUSTIN WELCH MD, Ot           N39.0   

        

URINARY TRACT INFECTION, SITE NOT SPECIF                    

 

             2019           AUSTIN WELCH MD, Ot           N39.0   

        

URINARY TRACT INFECTION, SITE NOT SPECIF                    

 

             2019           AUSTIN SARAVIA MD, Ot           F03.90 

          

UNSPECIFIED DEMENTIA WITHOUT BEHAVIORAL                     

 

             2019           AUSTIN SARAVIA MD, Ot           I50.9  

         

HEART FAILURE, UNSPECIFIED                       

 

             2019           AUSTIN SARAVIA MD, Ot           R09.02 

          

HYPOXEMIA                                        

 

             2019           AUSTIN SARAVIA MD, Ot           Z79.02 

          

LONG TERM (CURRENT) USE OF ANTITHROMBOTI                    

 

             2019           AUSTIN SARAVIA MD, Ot           Z79.4  

         LONG

TERM (CURRENT) USE OF INSULIN                    

 

             2019           AUSTIN SARAVIA MD, Ot           Z79.82 

          

LONG TERM (CURRENT) USE OF ASPIRIN                    

 

             2019           AUSTIN SARAVIA MD, Ot           Z88.8  

         

ALLERGY STATUS TO [facility] DRUG/MEDS/BIOL SUB                    

 

             2019           AUSTIN WELCH MD, Ot           I70.90  

         

UNSPECIFIED ATHEROSCLEROSIS                      

 

             2019           AUSTIN WELCH MD, Ot           M46.86  

         

OTHER SPECIFIED INFLAMMATORY SPONDYLOPAT                    

 

             2019           AUSTIN WELCH MD, Ot           M47.816 

          

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                    

 

             2019           AUSTIN WELCH MD, Ot           M85.88  

         OTH 

DISRD OF BONE DENSITY AND STRUCTURE,                    

 

             2019           AUSTIN WELCH MD, Ot           Z98.1   

        

ARTHRODESIS STATUS                               

 

             2019           AUSTIN WELCH MD, Ot           I70.90  

         

UNSPECIFIED ATHEROSCLEROSIS                      

 

             2019           AUSTIN WELCH MD, Ot           M46.86  

         

OTHER SPECIFIED INFLAMMATORY SPONDYLOPAT                    

 

             2019           AUSTIN WELCH MD, Ot           M47.816 

          

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                    

 

             2019           AUSTIN WELCH MD, Ot           M85.88  

         OTH 

DISRD OF BONE DENSITY AND STRUCTURE,                    

 

             2019           AUSTIN WELCH MD, Ot           Z98.1   

        

ARTHRODESIS STATUS                               

 

             2020           AUSTIN WELCH MD, Ot           I70.90  

         

UNSPECIFIED ATHEROSCLEROSIS                      

 

             2020           AUSTIN WELCH MD, Ot           M46.86  

         

OTHER SPECIFIED INFLAMMATORY SPONDYLOPAT                    

 

             2020           AUSTIN WELCH MD, Ot           M47.816 

          

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                    

 

             2020           AUSTIN WELCH MD, Ot           M85.88  

         OTH 

DISRD OF BONE DENSITY AND STRUCTURE,                    

 

             2020           YURI COLLADO, AUSTIN HENSLEY           Ot           Z98.1   

        

ARTHRODESIS STATUS                               



                                                                                
             



Procedures

      



There is no data.                  



Results

      



                    Test                Result              Range        

 

                                        A1C - 19 12:11         

 

                    HEMOGLOBIN A1c           7.3 % of total Hgb           <5.7  

      

 

                                        CULTURE, URINE - 19 13:25         

 

                    CULTURE, URINE, ROUTINE           SEE NOTE            NRG   

     

 

                                        Complete urinalysis with reflex to cultu

re - 19 13:30         

 

                    Urine color determination           RED                 NRG 

       

 

                    Urine clarity determination           CLOUDY              NR

G        

 

                    Urine pH measurement by test strip           7.0            

     5-9        

 

                    Specific gravity of urine by test strip           1.015     

          1.016-1.022  

     

 

                          Urine protein assay by test strip, semi-quantitative  

         NEGATIVE         

                                        NEGATIVE        

 

                    Urine glucose detection by automated test strip           NE

GATIVE            

NEGATIVE        

 

                          Erythrocytes detection in urine sediment by light micr

oscopy           3+       

                                        NEGATIVE        

 

                    Urine ketones detection by automated test strip           NE

GATIVE            

NEGATIVE        

 

                    Urine nitrite detection by test strip           NEGATIVE    

        NEGATIVE    

   

 

                    Urine total bilirubin detection by test strip           NEGA

TIVE            

NEGATIVE        

 

                          Urine urobilinogen measurement by automated test strip

 (mass/volume)           

0.2 mg/dL                               NORMAL        

 

                    Urine leukocyte esterase detection by dipstick           3+ 

                 NEGATIVE 

      

 

                                        Automated urine sediment erythrocyte cou

nt by microscopy (number/high power 

field)                    TNTC                      NRG        

 

                                        Automated urine sediment leukocyte count

 by microscopy (number/high power field)

                           [HPF]                    NRG        

 

                          Bacteria detection in urine sediment by light microsco

py           MODERATE     

                                        NRG        

 

                          Crystals detection in urine sediment by light microsco

py           NONE         

                                        NRG        

 

                    Casts detection in urine sediment by light microscopy       

    NONE                

NRG        

 

                          Mucus detection in urine sediment by light microscopy 

          NEGATIVE        

                                        NRG        

 

                    Complete urinalysis with reflex to culture           YES    

             NRG        

 

                                        Bacterial urine culture - 19 13:30

         

 

                    Bacterial urine culture           [id number]             NRG   

     

 

                    COLONY COUNT           >100,000/ML            NRG        

 

                    FTX;REPORTABLE           SUSCEPTIBILITY REPORTED  10:15  

          NRG       

 

 

                                        Dirithromycin susceptibility test by dis

k diffusion - 19 13:30         

 

                          Gentamicin susceptibility test by minimum inhibitory c

oncentration           R  

                                        NRG        

 

                                        Trimethoprim/sulfamethoxazole susceptibi

lity test by minimum 

inhibitoryconcentration           <=                        NRG        

 

                          Levofloxacin susceptibility test by minimum inhibitory

 concentration           >

                                        NRG        

 

                          Ampicillin susceptibility test by minimum inhibitory c

oncentration           >  

                                        NRG        

 

                          Cefazolin susceptibility test by minimum inhibitory co

ncentration           >   

                                        NRG        

 

                          Ceftriaxone susceptibility test by minimum inhibitory 

concentration           <=

                                        NRG        

 

                          Ciprofloxacin susceptibility test by minimum inhibitor

y concentration           

>                                       NRG        

 

                          Meropenem susceptibility test by minimum inhibitory co

ncentration           <=  

                                        NRG        

 

                                        Nitrofurantoin susceptibility test by mi

nimum inhibitory concentration          

                          >                         NRG        

 

                    Amoxicillin and clavulanate potassium susc WHITLEY           >  

                 NRG       

 

 

                                        Complete blood count (CBC) with automate

d white blood cell (WBC) differential - 

08/10/19 09:37         

 

                          Blood leukocytes automated count (number/volume)      

     8.7 10*3/uL          

                                        4.3-11.0        

 

                          Blood erythrocytes automated count (number/volume)    

       4.76 10*6/uL       

                                        4.35-5.85        

 

                    Venous blood hemoglobin measurement (mass/volume)           

14.7 g/dL           

11.5-16.0        

 

                    Blood hematocrit (volume fraction)           46 %           

     35-52        

 

                    Automated erythrocyte mean corpuscular volume           96 [

foz_us]           

80-99        

 

                                        Automated erythrocyte mean corpuscular h

emoglobin (mass per erythrocyte)        

                          31 pg                     25-34        

 

                                        Automated erythrocyte mean corpuscular h

emoglobin concentration measurement 

(mass/volume)             32 g/dL                   32-36        

 

                    Automated erythrocyte distribution width ratio           13.

2 %              10.0-

14.5        

 

                    Automated blood platelet count (count/volume)           272 

10*3/uL           

130-400        

 

                          Automated blood platelet mean volume measurement      

     8.9 [foz_us]         

                                        7.4-10.4        

 

                    Automated blood neutrophils/100 leukocytes           75 %   

             42-75       

 

 

                    Automated blood lymphocytes/100 leukocytes           18 %   

             12-44       

 

 

                    Blood monocytes/100 leukocytes           4 %                

 0-12        

 

                    Automated blood eosinophils/100 leukocytes           3 %    

             0-10        

 

                    Automated blood basophils/100 leukocytes           0 %      

           0-10        

 

                    Blood neutrophils automated count (number/volume)           

6.5 10*3            

1.8-7.8        

 

                    Blood lymphocytes automated count (number/volume)           

1.5 10*3            

1.0-4.0        

 

                    Blood monocytes automated count (number/volume)           0.

4 10*3            

0.0-1.0        

 

                    Automated eosinophil count           0.2 10*3/uL           0

.0-0.3        

 

                    Automated blood basophil count (count/volume)           0.0 

10*3/uL           

0.0-0.1        

 

                                        PT panel in platelet poor plasma by coag

ulation assay - 08/10/19 09:37         

 

                          Prothrombin time (PT) in platelet poor plasma by coagu

lation assay           

14.6 s                                  12.2-14.7        

 

                          INR in platelet poor plasma or blood by coagulation as

say           1.1         

                                        0.8-1.4        

 

                                        Activated partial thromboplastin time (a

PTT) in platelet poor plasma 

bycoagulation assay - 08/10/19 09:37         

 

                                        Activated partial thromboplastin time (a

PTT) in platelet poor plasma 

bycoagulation assay           26 s                      24-35        

 

                                        Comprehensive metabolic panel - 08/10/19

 09:37         

 

                          Serum or plasma sodium measurement (moles/volume)     

      143 mmol/L          

                                        135-145        

 

                          Serum or plasma potassium measurement (moles/volume)  

         4.6 mmol/L       

                                        3.6-5.0        

 

                          Serum or plasma chloride measurement (moles/volume)   

        102 mmol/L        

                                                

 

                    Carbon dioxide           27 mmol/L           21-32        

 

                          Serum or plasma anion gap determination (moles/volume)

           14 mmol/L      

                                        5-14        

 

                          Serum or plasma urea nitrogen measurement (mass/volume

)           17 mg/dL      

                                        7-18        

 

                          Serum or plasma creatinine measurement (mass/volume)  

         0.96 mg/dL       

                                        0.60-1.30        

 

                    Serum or plasma urea nitrogen/creatinine mass ratio         

  18                  NRG 

       

 

                                        Serum or plasma creatinine measurement w

ith calculation of estimated glomerular 

filtration rate           57                        NRG        

 

                    Serum or plasma glucose measurement (mass/volume)           

176 mg/dL           

        

 

                    Serum or plasma calcium measurement (mass/volume)           

9.3 mg/dL           

8.5-10.1        

 

                          Serum or plasma total bilirubin measurement (mass/volu

me)           1.1 mg/dL   

                                        0.1-1.0        

 

                                        Serum or plasma alkaline phosphatase virgilio

surement (enzymatic activity/volume)    

                          226 U/L                           

 

                                        Serum or plasma aspartate aminotransfera

se measurement (enzymatic 

activity/volume)           31 U/L                    5-34        

 

                                        Serum or plasma alanine aminotransferase

 measurement (enzymatic activity/volume)

                          21 U/L                    0-55        

 

                    Serum or plasma protein measurement (mass/volume)           

7.3 g/dL            

6.4-8.2        

 

                    Serum or plasma albumin measurement (mass/volume)           

3.9 g/dL            

3.2-4.5        

 

                    CALCIUM CORRECTED           9.4 mg/dL           8.5-10.1    

    

 

                                        Magnesium - 08/10/19 09:37         

 

                    Magnesium           1.8 mg/dL           1.8-2.4        

 

                                        Myoglobin, serum - 08/10/19 09:37       

  

 

                    Myoglobin, serum           53.9 ng/mL           10.0-92.0   

     

 

                                        Lipase - 08/10/19 09:37         

 

                    Lipase              18 U/L              8-78        

 

                                        Serum or plasma troponin i.cardiac measu

rement (mass/volume) - 08/10/19 09:37   

      

 

                          Serum or plasma troponin i.cardiac measurement (mass/v

olume)           < ng/mL  

                                        <0.30        

 

                                        PROBNP FS - 08/10/19 09:37         

 

                    PROBNP FS           1445.0 pg/mL           <75.0        

 

                                        Complete urinalysis with reflex to cultu

re - 08/10/19 11:50         

 

                    Urine color determination           PALE YELLOW            N

RG        

 

                    Urine clarity determination           SL CLOUDY            N

RG        

 

                    Urine pH measurement by test strip           7.5            

     5-9        

 

                    Specific gravity of urine by test strip           1.015     

          1.016-1.022  

      

 

                    Urine protein assay by test strip, semi-quantitative        

   TRACE               

NEGATIVE        

 

                    Urine glucose detection by automated test strip           NE

GATIVE            

NEGATIVE        

 

                          Erythrocytes detection in urine sediment by light micr

oscopy           TRACE    

                                        NEGATIVE        

 

                    Urine ketones detection by automated test strip           NE

GATIVE            

NEGATIVE        

 

                    Urine nitrite detection by test strip           NEGATIVE    

        NEGATIVE    

    

 

                    Urine total bilirubin detection by test strip           NEGA

TIVE            

NEGATIVE        

 

                          Urine urobilinogen measurement by automated test strip

 (mass/volume)           

0.2 mg/dL                               NORMAL        

 

                    Urine leukocyte esterase detection by dipstick           3+ 

                 NEGATIVE 

       

 

                                        Automated urine sediment erythrocyte cou

nt by microscopy (number/high power 

field)                    RARE                      NRG        

 

                                        Automated urine sediment leukocyte count

 by microscopy (number/high power field)

                           [HPF]                    NRG        

 

                          Bacteria detection in urine sediment by light microsco

py           MODERATE     

                                        NRG        

 

                                        Squamous epithelial cells detection in u

rine sediment by light microscopy       

                          10-25                     NRG        

 

                          Crystals detection in urine sediment by light microsco

py           NONE         

                                        NRG        

 

                    Casts detection in urine sediment by light microscopy       

    NONE                

NRG        

 

                    Mucus detection in urine sediment by light microscopy       

    NONE                

NRG        

 

                    Complete urinalysis with reflex to culture           YES    

             NRG        

 

                                        Bacterial urine culture - 08/10/19 11:50

         

 

                    Bacterial urine culture           [id number]            NRG   

     

 

                    COLONY COUNT           30,000 CFU/ML            NRG        

 

                    FTX;REPORTABLE           ID/SUSCEPTIBILITY REPORTED 19 

12:30            NRG

        

 

                                        Dirithromycin susceptibility test by dis

k diffusion - 08/10/19 11:50         

 

                          Gentamicin susceptibility test by minimum inhibitory c

oncentration           <= 

                                        NRG        

 

                                        Trimethoprim/sulfamethoxazole susceptibi

lity test by minimum 

inhibitoryconcentration           =                         NRG        

 

                          Levofloxacin susceptibility test by minimum inhibitory

 concentration           >

                                        NRG        

 

                          Ampicillin susceptibility test by minimum inhibitory c

oncentration           <= 

                                        NRG        

 

                          Cefazolin susceptibility test by minimum inhibitory co

ncentration           4   

                                        NRG        

 

                          Ceftriaxone susceptibility test by minimum inhibitory 

concentration           <=

                                        NRG        

 

                          Ciprofloxacin susceptibility test by minimum inhibitor

y concentration           

>                                       NRG        

 

                                        Nitrofurantoin susceptibility test by mi

nimum inhibitory concentration          

                          R                         NRG        

 

                    Amoxicillin and clavulanate potassium susc WHILTEY           <= 

                 NRG      

  

 

                                        Dirithromycin susceptibility test by dis

k diffusion - 08/10/19 11:50         

 

                          Gentamicin susceptibility test by minimum inhibitory c

oncentration           <= 

                                        NRG        

 

                                        Trimethoprim/sulfamethoxazole susceptibi

lity test by minimum 

inhibitoryconcentration           =                         NRG        

 

                          Levofloxacin susceptibility test by minimum inhibitory

 concentration           >

                                        NRG        

 

                          Ampicillin susceptibility test by minimum inhibitory c

oncentration           <= 

                                        NRG        

 

                          Cefazolin susceptibility test by minimum inhibitory co

ncentration           8   

                                        NRG        

 

                          Ceftriaxone susceptibility test by minimum inhibitory 

concentration           <=

                                        NRG        

 

                          Ciprofloxacin susceptibility test by minimum inhibitor

y concentration           

>                                       NRG        

 

                                        Nitrofurantoin susceptibility test by mi

nimum inhibitory concentration          

                          >                         NRG        

 

                    Amoxicillin and clavulanate potassium susc WHITLEY           <= 

                 NRG      

  



                                                  



Encounters

      



                ACCT No.           Visit Date/Time           Discharge          

 Status         

             Pt. Type           Provider           Facility           Loc./Unit 

          

Complaint        

 

                [id number]           2019 16:45:00           2019 23:59:

59           CLS

                Outpatient                                           [facility]  

                                                 

 

             [id number]           2019 16:45:00                              

       Document

 Registration                                                                   

 

 

             [id number]           2019 17:45:00                              

       Document

 Registration                                                                   

 

 

                    [id number]           2019 16:27:00            23:59:59        

                CLS             Outpatient           YURI COLLADO, [place] [facility]           RAD                       ACUTE L SIDED LOW BACK 

PAIN WITH 

SCIATICA, L HIP        

 

                    [id number]           08/10/2019 09:20:00           08/10/2

019 12:15:00        

                DIS             Emergency           MANJIT COLLADO, [place] [facility]           ER FS                     [facility]        

 

                    [id number]           2019 14:01:00            23:59:59        

                CLS             Outpatient           [place] WELCH MD [facility]           LAB FS                    [id number]

## 2020-07-12 NOTE — XMS REPORT
Continuity of Care Document

                             Created on: 2020



Antonella Kuo

External Reference #: [id number]

: 1945

Sex: Female



Demographics





                          Address                   [address] [place]  [address]

 

                          Home Phone                [phone] x

 

                          Preferred Language        Unknown

 

                          Marital Status            Unknown

 

                          [employer] Affiliation     Unknown

 

                          Race                      Unknown

 

                          Ethnic Group              Unknown





Author





                          Organization              Unknown

 

                          Address                   Unknown

 

                          Phone                     Unavailable



              



Allergies

      



             Active           Description           Code           Type         

  Severity   

                Reaction           Onset           Reported/Identified          

 

Relationship to Patient                 Clinical Status        

 

                Yes             No Known Drug Allergies           [id number]    

       Drug 

Allergy           Unknown           N/A                             2012  

      

                                                             

 

             Yes           diazepam           [id number]           Drug Allergy 

          

Unknown           N/A                        08/10/2019                         

  

     



                    



Medications

      



There is no data.                  



Problems

      



             Date Dx Coded           Attending           Type           Code    

       

Diagnosis                               Diagnosed By        

 

             2019           AUSTIN WELCH MD, Ot           N39.0   

        

URINARY TRACT INFECTION, SITE NOT SPECIF                    

 

             08/10/2019           AUSTIN SARAVIA MD, Ot           F03.90 

          

UNSPECIFIED DEMENTIA WITHOUT BEHAVIORAL                     

 

             08/10/2019           AUSTIN SARAVIA MD, Ot           I50.9  

         

HEART FAILURE, UNSPECIFIED                       

 

             08/10/2019           AUSTIN SARAVIA MD           Ot           R09.02 

          

HYPOXEMIA                                        

 

             08/10/2019           AUSTIN SARAVIA MD           Ot           Z79.02 

          

LONG TERM (CURRENT) USE OF ANTITHROMBOTI                    

 

             08/10/2019           AUSTIN SARAVIA MD           Ot           Z79.4  

         LONG

TERM (CURRENT) USE OF INSULIN                    

 

             08/10/2019           AUSTIN SARAVIA MD           Ot           Z79.82 

          

LONG TERM (CURRENT) USE OF ASPIRIN                    

 

             08/10/2019           AUSTIN SARAVIA MD           Ot           Z88.8  

         

ALLERGY STATUS TO [facility] DRUG/MEDS/BIOL SUB                    

 

             08/15/2019           AUSTIN SARAVIA MD, Ot           F03.90 

          

UNSPECIFIED DEMENTIA WITHOUT BEHAVIORAL                     

 

             08/15/2019           AUSTIN SARAVIA MD, Ot           I50.9  

         

HEART FAILURE, UNSPECIFIED                       

 

             08/15/2019           AUSTIN SARAVIA MD           Ot           R09.02 

          

HYPOXEMIA                                        

 

             08/15/2019           AUSTIN SARAVIA MD           Ot           Z79.02 

          

LONG TERM (CURRENT) USE OF ANTITHROMBOTI                    

 

             08/15/2019           AUSTIN SARAVIA MD, Ot           Z79.4  

         LONG

TERM (CURRENT) USE OF INSULIN                    

 

             08/15/2019           AUSTIN SARAVIA MD           Ot           Z79.82 

          

LONG TERM (CURRENT) USE OF ASPIRIN                    

 

             08/15/2019           AUSTIN SARAVIA MD           Ot           Z88.8  

         

ALLERGY STATUS TO OTH DRUG/MEDS/BIOL SUB                    

 

             2019           AUSTIN WELCH MD, Ot           N39.0   

        

URINARY TRACT INFECTION, SITE NOT SPECIF                    

 

             2019           AUSTIN WELCH MD, Ot           N39.0   

        

URINARY TRACT INFECTION, SITE NOT SPECIF                    

 

             2019           AUSTIN SARAVIA MD, Ot           F03.90 

          

UNSPECIFIED DEMENTIA WITHOUT BEHAVIORAL                     

 

             2019           AUSTIN SARAVIA MD, Ot           I50.9  

         

HEART FAILURE, UNSPECIFIED                       

 

             2019           AUSTIN SARAVIA MD, Ot           R09.02 

          

HYPOXEMIA                                        

 

             2019           AUSTIN SARAVIA MD, Ot           Z79.02 

          

LONG TERM (CURRENT) USE OF ANTITHROMBOTI                    

 

             2019           AUSTIN SARAVIA MD, Ot           Z79.4  

         LONG

TERM (CURRENT) USE OF INSULIN                    

 

             2019           AUSTIN SARAVIA MD, Ot           Z79.82 

          

LONG TERM (CURRENT) USE OF ASPIRIN                    

 

             2019           AUSTIN SARAVIA MD, Ot           Z88.8  

         

ALLERGY STATUS TO [facility] DRUG/MEDS/BIOL SUB                    

 

             2019           AUSTIN WELCH MD, Ot           I70.90  

         

UNSPECIFIED ATHEROSCLEROSIS                      

 

             2019           AUSTIN WELCH MD, Ot           M46.86  

         

OTHER SPECIFIED INFLAMMATORY SPONDYLOPAT                    

 

             2019           AUSTIN WELCH MD, Ot           M47.816 

          

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                    

 

             2019           AUSTIN WELCH MD, Ot           M85.88  

         OTH 

DISRD OF BONE DENSITY AND STRUCTURE,                    

 

             2019           AUSTIN WELCH MD, Ot           Z98.1   

        

ARTHRODESIS STATUS                               

 

             2019           AUSTIN WELCH MD, Ot           I70.90  

         

UNSPECIFIED ATHEROSCLEROSIS                      

 

             2019           AUSTIN WELCH MD, Ot           M46.86  

         

OTHER SPECIFIED INFLAMMATORY SPONDYLOPAT                    

 

             2019           AUSTIN WELCH MD, Ot           M47.816 

          

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                    

 

             2019           AUSTIN WELCH MD, Ot           M85.88  

         OTH 

DISRD OF BONE DENSITY AND STRUCTURE,                    

 

             2019           AUSTIN WELCH MD, Ot           Z98.1   

        

ARTHRODESIS STATUS                               

 

             2020           AUSTIN WELCH MD, Ot           I70.90  

         

UNSPECIFIED ATHEROSCLEROSIS                      

 

             2020           AUSTIN WELCH MD, Ot           M46.86  

         

OTHER SPECIFIED INFLAMMATORY SPONDYLOPAT                    

 

             2020           AUSTIN WELCH MD, Ot           M47.816 

          

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                    

 

             2020           AUSTIN WELCH MD, Ot           M85.88  

         OTH 

DISRD OF BONE DENSITY AND STRUCTURE,                    

 

             2020           YURI COLLADO, AUSTIN HENSLEY           Ot           Z98.1   

        

ARTHRODESIS STATUS                               



                                                                                
             



Procedures

      



There is no data.                  



Results

      



                    Test                Result              Range        

 

                                        A1C - 19 12:11         

 

                    HEMOGLOBIN A1c           7.3 % of total Hgb           <5.7  

      

 

                                        CULTURE, URINE - 19 13:25         

 

                    CULTURE, URINE, ROUTINE           SEE NOTE            NRG   

     

 

                                        Complete urinalysis with reflex to cultu

re - 19 13:30         

 

                    Urine color determination           RED                 NRG 

       

 

                    Urine clarity determination           CLOUDY              NR

G        

 

                    Urine pH measurement by test strip           7.0            

     5-9        

 

                    Specific gravity of urine by test strip           1.015     

          1.016-1.022  

     

 

                          Urine protein assay by test strip, semi-quantitative  

         NEGATIVE         

                                        NEGATIVE        

 

                    Urine glucose detection by automated test strip           NE

GATIVE            

NEGATIVE        

 

                          Erythrocytes detection in urine sediment by light micr

oscopy           3+       

                                        NEGATIVE        

 

                    Urine ketones detection by automated test strip           NE

GATIVE            

NEGATIVE        

 

                    Urine nitrite detection by test strip           NEGATIVE    

        NEGATIVE    

   

 

                    Urine total bilirubin detection by test strip           NEGA

TIVE            

NEGATIVE        

 

                          Urine urobilinogen measurement by automated test strip

 (mass/volume)           

0.2 mg/dL                               NORMAL        

 

                    Urine leukocyte esterase detection by dipstick           3+ 

                 NEGATIVE 

      

 

                                        Automated urine sediment erythrocyte cou

nt by microscopy (number/high power 

field)                    TNTC                      NRG        

 

                                        Automated urine sediment leukocyte count

 by microscopy (number/high power field)

                           [HPF]                    NRG        

 

                          Bacteria detection in urine sediment by light microsco

py           MODERATE     

                                        NRG        

 

                          Crystals detection in urine sediment by light microsco

py           NONE         

                                        NRG        

 

                    Casts detection in urine sediment by light microscopy       

    NONE                

NRG        

 

                          Mucus detection in urine sediment by light microscopy 

          NEGATIVE        

                                        NRG        

 

                    Complete urinalysis with reflex to culture           YES    

             NRG        

 

                                        Bacterial urine culture - 19 13:30

         

 

                    Bacterial urine culture           [id number]             NRG   

     

 

                    COLONY COUNT           >100,000/ML            NRG        

 

                    FTX;REPORTABLE           SUSCEPTIBILITY REPORTED  10:15  

          NRG       

 

 

                                        Dirithromycin susceptibility test by dis

k diffusion - 19 13:30         

 

                          Gentamicin susceptibility test by minimum inhibitory c

oncentration           R  

                                        NRG        

 

                                        Trimethoprim/sulfamethoxazole susceptibi

lity test by minimum 

inhibitoryconcentration           <=                        NRG        

 

                          Levofloxacin susceptibility test by minimum inhibitory

 concentration           >

                                        NRG        

 

                          Ampicillin susceptibility test by minimum inhibitory c

oncentration           >  

                                        NRG        

 

                          Cefazolin susceptibility test by minimum inhibitory co

ncentration           >   

                                        NRG        

 

                          Ceftriaxone susceptibility test by minimum inhibitory 

concentration           <=

                                        NRG        

 

                          Ciprofloxacin susceptibility test by minimum inhibitor

y concentration           

>                                       NRG        

 

                          Meropenem susceptibility test by minimum inhibitory co

ncentration           <=  

                                        NRG        

 

                                        Nitrofurantoin susceptibility test by mi

nimum inhibitory concentration          

                          >                         NRG        

 

                    Amoxicillin and clavulanate potassium susc WHITLEY           >  

                 NRG       

 

 

                                        Complete blood count (CBC) with automate

d white blood cell (WBC) differential - 

08/10/19 09:37         

 

                          Blood leukocytes automated count (number/volume)      

     8.7 10*3/uL          

                                        4.3-11.0        

 

                          Blood erythrocytes automated count (number/volume)    

       4.76 10*6/uL       

                                        4.35-5.85        

 

                    Venous blood hemoglobin measurement (mass/volume)           

14.7 g/dL           

11.5-16.0        

 

                    Blood hematocrit (volume fraction)           46 %           

     35-52        

 

                    Automated erythrocyte mean corpuscular volume           96 [

foz_us]           

80-99        

 

                                        Automated erythrocyte mean corpuscular h

emoglobin (mass per erythrocyte)        

                          31 pg                     25-34        

 

                                        Automated erythrocyte mean corpuscular h

emoglobin concentration measurement 

(mass/volume)             32 g/dL                   32-36        

 

                    Automated erythrocyte distribution width ratio           13.

2 %              10.0-

14.5        

 

                    Automated blood platelet count (count/volume)           272 

10*3/uL           

130-400        

 

                          Automated blood platelet mean volume measurement      

     8.9 [foz_us]         

                                        7.4-10.4        

 

                    Automated blood neutrophils/100 leukocytes           75 %   

             42-75       

 

 

                    Automated blood lymphocytes/100 leukocytes           18 %   

             12-44       

 

 

                    Blood monocytes/100 leukocytes           4 %                

 0-12        

 

                    Automated blood eosinophils/100 leukocytes           3 %    

             0-10        

 

                    Automated blood basophils/100 leukocytes           0 %      

           0-10        

 

                    Blood neutrophils automated count (number/volume)           

6.5 10*3            

1.8-7.8        

 

                    Blood lymphocytes automated count (number/volume)           

1.5 10*3            

1.0-4.0        

 

                    Blood monocytes automated count (number/volume)           0.

4 10*3            

0.0-1.0        

 

                    Automated eosinophil count           0.2 10*3/uL           0

.0-0.3        

 

                    Automated blood basophil count (count/volume)           0.0 

10*3/uL           

0.0-0.1        

 

                                        PT panel in platelet poor plasma by coag

ulation assay - 08/10/19 09:37         

 

                          Prothrombin time (PT) in platelet poor plasma by coagu

lation assay           

14.6 s                                  12.2-14.7        

 

                          INR in platelet poor plasma or blood by coagulation as

say           1.1         

                                        0.8-1.4        

 

                                        Activated partial thromboplastin time (a

PTT) in platelet poor plasma 

bycoagulation assay - 08/10/19 09:37         

 

                                        Activated partial thromboplastin time (a

PTT) in platelet poor plasma 

bycoagulation assay           26 s                      24-35        

 

                                        Comprehensive metabolic panel - 08/10/19

 09:37         

 

                          Serum or plasma sodium measurement (moles/volume)     

      143 mmol/L          

                                        135-145        

 

                          Serum or plasma potassium measurement (moles/volume)  

         4.6 mmol/L       

                                        3.6-5.0        

 

                          Serum or plasma chloride measurement (moles/volume)   

        102 mmol/L        

                                                

 

                    Carbon dioxide           27 mmol/L           21-32        

 

                          Serum or plasma anion gap determination (moles/volume)

           14 mmol/L      

                                        5-14        

 

                          Serum or plasma urea nitrogen measurement (mass/volume

)           17 mg/dL      

                                        7-18        

 

                          Serum or plasma creatinine measurement (mass/volume)  

         0.96 mg/dL       

                                        0.60-1.30        

 

                    Serum or plasma urea nitrogen/creatinine mass ratio         

  18                  NRG 

       

 

                                        Serum or plasma creatinine measurement w

ith calculation of estimated glomerular 

filtration rate           57                        NRG        

 

                    Serum or plasma glucose measurement (mass/volume)           

176 mg/dL           

        

 

                    Serum or plasma calcium measurement (mass/volume)           

9.3 mg/dL           

8.5-10.1        

 

                          Serum or plasma total bilirubin measurement (mass/volu

me)           1.1 mg/dL   

                                        0.1-1.0        

 

                                        Serum or plasma alkaline phosphatase virgilio

surement (enzymatic activity/volume)    

                          226 U/L                           

 

                                        Serum or plasma aspartate aminotransfera

se measurement (enzymatic 

activity/volume)           31 U/L                    5-34        

 

                                        Serum or plasma alanine aminotransferase

 measurement (enzymatic activity/volume)

                          21 U/L                    0-55        

 

                    Serum or plasma protein measurement (mass/volume)           

7.3 g/dL            

6.4-8.2        

 

                    Serum or plasma albumin measurement (mass/volume)           

3.9 g/dL            

3.2-4.5        

 

                    CALCIUM CORRECTED           9.4 mg/dL           8.5-10.1    

    

 

                                        Magnesium - 08/10/19 09:37         

 

                    Magnesium           1.8 mg/dL           1.8-2.4        

 

                                        Myoglobin, serum - 08/10/19 09:37       

  

 

                    Myoglobin, serum           53.9 ng/mL           10.0-92.0   

     

 

                                        Lipase - 08/10/19 09:37         

 

                    Lipase              18 U/L              8-78        

 

                                        Serum or plasma troponin i.cardiac measu

rement (mass/volume) - 08/10/19 09:37   

      

 

                          Serum or plasma troponin i.cardiac measurement (mass/v

olume)           < ng/mL  

                                        <0.30        

 

                                        PROBNP FS - 08/10/19 09:37         

 

                    PROBNP FS           1445.0 pg/mL           <75.0        

 

                                        Complete urinalysis with reflex to cultu

re - 08/10/19 11:50         

 

                    Urine color determination           PALE YELLOW            N

RG        

 

                    Urine clarity determination           SL CLOUDY            N

RG        

 

                    Urine pH measurement by test strip           7.5            

     5-9        

 

                    Specific gravity of urine by test strip           1.015     

          1.016-1.022  

      

 

                    Urine protein assay by test strip, semi-quantitative        

   TRACE               

NEGATIVE        

 

                    Urine glucose detection by automated test strip           NE

GATIVE            

NEGATIVE        

 

                          Erythrocytes detection in urine sediment by light micr

oscopy           TRACE    

                                        NEGATIVE        

 

                    Urine ketones detection by automated test strip           NE

GATIVE            

NEGATIVE        

 

                    Urine nitrite detection by test strip           NEGATIVE    

        NEGATIVE    

    

 

                    Urine total bilirubin detection by test strip           NEGA

TIVE            

NEGATIVE        

 

                          Urine urobilinogen measurement by automated test strip

 (mass/volume)           

0.2 mg/dL                               NORMAL        

 

                    Urine leukocyte esterase detection by dipstick           3+ 

                 NEGATIVE 

       

 

                                        Automated urine sediment erythrocyte cou

nt by microscopy (number/high power 

field)                    RARE                      NRG        

 

                                        Automated urine sediment leukocyte count

 by microscopy (number/high power field)

                           [HPF]                    NRG        

 

                          Bacteria detection in urine sediment by light microsco

py           MODERATE     

                                        NRG        

 

                                        Squamous epithelial cells detection in u

rine sediment by light microscopy       

                          10-25                     NRG        

 

                          Crystals detection in urine sediment by light microsco

py           NONE         

                                        NRG        

 

                    Casts detection in urine sediment by light microscopy       

    NONE                

NRG        

 

                    Mucus detection in urine sediment by light microscopy       

    NONE                

NRG        

 

                    Complete urinalysis with reflex to culture           YES    

             NRG        

 

                                        Bacterial urine culture - 08/10/19 11:50

         

 

                    Bacterial urine culture           [id number]            NRG   

     

 

                    COLONY COUNT           30,000 CFU/ML            NRG        

 

                    FTX;REPORTABLE           ID/SUSCEPTIBILITY REPORTED 19 

12:30            NRG

        

 

                                        Dirithromycin susceptibility test by dis

k diffusion - 08/10/19 11:50         

 

                          Gentamicin susceptibility test by minimum inhibitory c

oncentration           <= 

                                        NRG        

 

                                        Trimethoprim/sulfamethoxazole susceptibi

lity test by minimum 

inhibitoryconcentration           =                         NRG        

 

                          Levofloxacin susceptibility test by minimum inhibitory

 concentration           >

                                        NRG        

 

                          Ampicillin susceptibility test by minimum inhibitory c

oncentration           <= 

                                        NRG        

 

                          Cefazolin susceptibility test by minimum inhibitory co

ncentration           4   

                                        NRG        

 

                          Ceftriaxone susceptibility test by minimum inhibitory 

concentration           <=

                                        NRG        

 

                          Ciprofloxacin susceptibility test by minimum inhibitor

y concentration           

>                                       NRG        

 

                                        Nitrofurantoin susceptibility test by mi

nimum inhibitory concentration          

                          R                         NRG        

 

                    Amoxicillin and clavulanate potassium susc WHITLEY           <= 

                 NRG      

  

 

                                        Dirithromycin susceptibility test by dis

k diffusion - 08/10/19 11:50         

 

                          Gentamicin susceptibility test by minimum inhibitory c

oncentration           <= 

                                        NRG        

 

                                        Trimethoprim/sulfamethoxazole susceptibi

lity test by minimum 

inhibitoryconcentration           =                         NRG        

 

                          Levofloxacin susceptibility test by minimum inhibitory

 concentration           >

                                        NRG        

 

                          Ampicillin susceptibility test by minimum inhibitory c

oncentration           <= 

                                        NRG        

 

                          Cefazolin susceptibility test by minimum inhibitory co

ncentration           8   

                                        NRG        

 

                          Ceftriaxone susceptibility test by minimum inhibitory 

concentration           <=

                                        NRG        

 

                          Ciprofloxacin susceptibility test by minimum inhibitor

y concentration           

>                                       NRG        

 

                                        Nitrofurantoin susceptibility test by mi

nimum inhibitory concentration          

                          >                         NRG        

 

                    Amoxicillin and clavulanate potassium susc WHITLEY           <= 

                 NRG      

  

 

                                        Complete blood count (CBC) with automate

d white blood cell (WBC) differential - 

20 04:09         

 

                          Blood leukocytes automated count (number/volume)      

     10.1 10*3/uL         

                                        4.3-11.0        

 

                          Blood erythrocytes automated count (number/volume)    

       4.80 10*6/uL       

                                        4.35-5.85        

 

                    Venous blood hemoglobin measurement (mass/volume)           

14.9 g/dL           

11.5-16.0        

 

                    Blood hematocrit (volume fraction)           46 %           

     35-52        

 

                    Automated erythrocyte mean corpuscular volume           96 [

foz_us]           

80-99        

 

                                        Automated erythrocyte mean corpuscular h

emoglobin (mass per erythrocyte)        

                          31 pg                     25-34        

 

                                        Automated erythrocyte mean corpuscular h

emoglobin concentration measurement 

(mass/volume)             32 g/dL                   32-36        

 

                    Automated erythrocyte distribution width ratio           13.

1 %              10.0-

14.5        

 

                    Automated blood platelet count (count/volume)           304 

10*3/uL           

130-400        

 

                          Automated blood platelet mean volume measurement      

     8.8 [foz_us]         

                                        7.4-10.4        

 

                    Automated blood neutrophils/100 leukocytes           70 %   

             42-75       

 

 

                    Automated blood lymphocytes/100 leukocytes           22 %   

             12-44       

 

 

                    Blood monocytes/100 leukocytes           5 %                

 0-12        

 

                    Automated blood eosinophils/100 leukocytes           2 %    

             0-10        

 

                    Automated blood basophils/100 leukocytes           0 %      

           0-10        

 

                    Blood neutrophils automated count (number/volume)           

7.1 10*3            

1.8-7.8        

 

                    Blood lymphocytes automated count (number/volume)           

2.2 10*3            

1.0-4.0        

 

                    Blood monocytes automated count (number/volume)           0.

5 10*3            

0.0-1.0        

 

                    Automated eosinophil count           0.2 10*3/uL           0

.0-0.3        

 

                    Automated blood basophil count (count/volume)           0.0 

10*3/uL           

0.0-0.1        

 

                                        PT panel in platelet poor plasma by coag

ulation assay - 20 04:09         

 

                          Prothrombin time (PT) in platelet poor plasma by coagu

lation assay           

12.3 s                                  12.2-14.7        

 

                          INR in platelet poor plasma or blood by coagulation as

say           0.9         

                                        0.8-1.4        

 

                                        Activated partial thromboplastin time (a

PTT) in platelet poor plasma 

bycoagulation assay - 20 04:09         

 

                                        Activated partial thromboplastin time (a

PTT) in platelet poor plasma 

bycoagulation assay           27 s                      24-35        

 

                                        Comprehensive metabolic panel - 20

 04:09         

 

                          Serum or plasma sodium measurement (moles/volume)     

      144 mmol/L          

                                        135-145        

 

                          Serum or plasma potassium measurement (moles/volume)  

         4.4 mmol/L       

                                        3.6-5.0        

 

                          Serum or plasma chloride measurement (moles/volume)   

        102 mmol/L        

                                                

 

                    Carbon dioxide           33 mmol/L           21-32        

 

                          Serum or plasma anion gap determination (moles/volume)

           9 mmol/L       

                                        5-14        

 

                          Serum or plasma urea nitrogen measurement (mass/volume

)           20 mg/dL      

                                        7-18        

 

                          Serum or plasma creatinine measurement (mass/volume)  

         0.98 mg/dL       

                                        0.60-1.30        

 

                    Serum or plasma urea nitrogen/creatinine mass ratio         

  20                  NRG 

       

 

                                        Serum or plasma creatinine measurement w

ith calculation of estimated glomerular 

filtration rate           55                        NRG        

 

                    Serum or plasma glucose measurement (mass/volume)           

62 mg/dL            

        

 

                    Serum or plasma calcium measurement (mass/volume)           

9.5 mg/dL           

8.5-10.1        

 

                          Serum or plasma total bilirubin measurement (mass/volu

me)           0.9 mg/dL   

                                        0.1-1.0        

 

                                        Serum or plasma alkaline phosphatase virgilio

surement (enzymatic activity/volume)    

                          196 U/L                           

 

                                        Serum or plasma aspartate aminotransfera

se measurement (enzymatic 

activity/volume)           27 U/L                    5-34        

 

                                        Serum or plasma alanine aminotransferase

 measurement (enzymatic activity/volume)

                          24 U/L                    0-55        

 

                    Serum or plasma protein measurement (mass/volume)           

6.7 g/dL            

6.4-8.2        

 

                    Serum or plasma albumin measurement (mass/volume)           

3.8 g/dL            

3.2-4.5        

 

                    CALCIUM CORRECTED           9.7 mg/dL           8.5-10.1    

    

 

                                        TROPONIN I 20 04:09         

 

                    TROPONIN I FS           < 0.30              <0.30        

 

                                        PROBNP 20 04:09         

 

                    PROBNP FS           1266.0 pg/mL           <75.0        



                                                              



Encounters

      



                ACCT No.           Visit Date/Time           Discharge          

 Status         

             Pt. Type           Provider           Facility           Loc./Unit 

          

Complaint        

 

                [id number]           2019 16:45:00           2019 23:59:

59           CLS

                Outpatient                                           [facility]  

                                                 

 

             [id number]           2019 16:45:00                              

       Document

 Registration                                                                   

 

 

             [id number]           2019 17:45:00                              

       Document

 Registration                                                                   

 

 

                    [id number]           2019 16:27:00            23:59:59        

                CLS             Outpatient           YURI COLLADO, [place] [facility]           RAD                       ACUTE L SIDED LOW BACK 

PAIN WITH 

[employer], L HIP        

 

                    [id number]           08/10/2019 09:20:00           08/10/2

019 12:15:00        

                DIS             Emergency           MANJIT COLLADO, [place] [facility]           ER FS                     [facility]        

 

                    [id number]           2019 14:01:00            23:59:59        

                CLS             Outpatient           YURI COLLADO, [place] [facility]           LAB FS                    N39         

 

             [id number]           2020 04:16:00                         

            

Document Registration

## 2020-07-12 NOTE — DISCHARGE SUMMARY
Discharge Summary


Hospital Course


Was the Problem List Reviewed?:  Yes


Problems/Dx:  


(1) Bradycardia


Status:  Acute


Hospital Course


Date of Admission: 2020 at 06:24 


Admission Diagnosis :  Bradycardia





Family Physician/Provider: Austin Bustillo MD  





Date of Discharge: 20 


Discharge Diagnosis:  Drug-induced bradycardia








Hospital Course:


Antonella Kuo is a 75 year old female who presented with bradycardia. She had been 

on high-dose metoprolol and this was held. Her heart rates improved. Cardiology 

was consulted and discontinued her metoprolol. She was started on amlodipine. 

She was discharged back to [facility] in stable condition.














Labs and Pending Lab Test:


Laboratory Tests


20 04:09: 


White Blood Count 10.1, Red Blood Count 4.80, Hemoglobin 14.9, Hematocrit 46, 

Mean Corpuscular Volume 96, Mean Corpuscular Hemoglobin 31, Mean Corpuscular 

Hemoglobin Concent 32, Red Cell Distribution Width 13.1, Platelet Count 304, 

Mean Platelet Volume 8.8, Neutrophils (%) (Auto) 70, Lymphocytes (%) (Auto) 22, 

Monocytes (%) (Auto) 5, Eosinophils (%) (Auto) 2, Basophils (%) (Auto) 0, 

Neutrophils # (Auto) 7.1, Lymphocytes # (Auto) 2.2, Monocytes # (Auto) 0.5, 

Eosinophils # (Auto) 0.2, Basophils # (Auto) 0.0, Prothrombin Time 12.3, INR 

Comment 0.9, Activated Partial Thromboplast Time 27, Sodium Level 144, Potassium

Level 4.4, Chloride Level 102, Carbon Dioxide Level 33H, Anion Gap 9, Blood Urea

Nitrogen 20H, Creatinine 0.98, Estimat Glomerular Filtration Rate 55, 

BUN/Creatinine Ratio 20, Glucose Level 62L, Calcium Level 9.5, Corrected Calcium

9.7, Total Bilirubin 0.9, Aspartate Amino Transf (AST/SGOT) 27, Alanine Amin

otransferase (ALT/SGPT) 24, Alkaline Phosphatase 196H, Troponin I < 0.30, 

Pro-B-Type Natriuretic Peptide 1266.0H, Total Protein 6.7, Albumin 3.8


20 11:07: Glucometer 83


20 12:40: Troponin I 0.031H


20 15:50: Glucometer 155H





Home Meds


Active


Amlodipine Besylate 5 Mg Tablet 5 Mg PO DAILY


Furosemide 20 Mg Tablet 40 Mg PO DAILY PRN 30 Days


Reported


Gentamicin Sulfate 5 Ml Drops 5 Ml OP 


     Written 19: Gentamicin Sulfate Solution 0.3%--Instill 1 drop to


     Left eye four times a day for conjuctivitis for 7 days


Milk of Magnesia (Magnesium Hydroxide) 400 Mg/5 Ml Oral.susp 2,400 Mg PO DAILY


Protonix (Pantoprazole Sodium) 20 Mg Tablet.dr 20 Mg PO DAILY


Lipitor (Atorvastatin Calcium) 40 Mg Tablet 40 Mg PO DAILY


Levemir (Insulin Determir) 1,000 Units/10 Ml Soln 50 Units SQ DAILY


Levemir (Insulin Determir) 1,000 Units/10 Ml Soln 60 Units SQ HS


Klor-Con 10 (Potassium Chloride) 10 Meq Tablet.er 10 Meq PO DAILY


Gabapentin 100 Mg Capsule 100 Mg PO TID


Colace (Docusate Sodium) 100 Mg Capsule 100 Mg PO BID


Clopidogrel (Clopidogrel Bisulfate) 75 Mg Tablet 75 Mg PO DAILY


Amaryl (Glimepiride) 2 Mg Tab 2 Mg PO DAILY


Paroxetine HCl 30 Mg Tablet 30 Mg PO DAILY


Furosemide 40 Mg Tablet 40 Mg PO DAILY


Novolog Pen (Insulin Aspart) 100 Unit/1 Ml Insuln.pen 8 Unit SQ 


     eight units with each meal and add 2 units for every 50 above 200.


Aspirin Ec 81 Mg (Aspirin) 81 Mg Tabec 81 Mg PO DAILY


Lotensin 20 Mg (Benazepril HCl) 20 Mg Tablet 20 Mg PO BID


Tylenol (Acetaminophen) 500 Mg Tablet 500-1,000 Mg PO Q6HRS MILD PAIN PRN


Assessment/Pt Instructions


Take medications as prescribed. Discontinue metoprolol. Begin amlodipine. Follow

up on next nursing home rounds.


Discharge Planning:  <30 minutes discharge planning





Discharge Instructions


Discharge Diet:  Low Sodium Diet


Activity as Tolerated:  Yes





Discharge Physical Examination


Vital Signs





Vital Signs








  Date Time  Temp Pulse Resp B/P (MAP) Pulse Ox O2 Delivery O2 Flow Rate FiO2


 


20 16:32      Nasal Cannula 2.00 


 


20 16:00 36.8 64 20 150/89 (109) [phone]   








General Appearance:  No Apparent Distress


Allergies:  


Coded Allergies:  


     diazepam (Unverified  Adverse Reaction, Unknown, 8/10/19)





Copy


Copies To 1:   [place] BUSTILLO MD





Discharge Summary


Date of Admission


2020 at 06:24


Date of Discharge


2020 at 17:39


Discharge Date:  2020


Discharge Time:  17:39


Admission Diagnosis


Bradycardia


Discharge Diagnosis


Drug induced bradycardia





(1) Drug-induced bradycardia


Status:  Acute





Clinical Quality Measures


DVT/VTE Risk/Contraindication:


Risk Factor Score Per Nursin


RFS Level Per Nursing on Admit:  2=Moderate











DAVID PAYNE MD              2020 20:23

## 2020-07-12 NOTE — ED GENERAL
General


Stated Complaint:  DECREASED HEART RATE





History of Present Illness


Date Seen by Provider:  Jul 12, 2020


Time Seen by Provider:  03:50


Initial Comments


The patient is a 75-year-old female with a history of hypertension on daily 

metoprolol, hyperlipidemia, coronary artery disease s/p stenting, some degree of

heart failure, insulin-dependent diabetes, h/o prior CVA, dementia. She is on 

nasal cannula oxygen at her nursing facility, 2-3L at baseline per EMS report, 

and is saturating 100% on 2L per NC here. She presents with concern for a slower

than normal heart rate measured at her facility prior to arrival. Reportedly the

patient's heart rate is normally in the 60s but prior to EMS being called it was

measured in the mid to high 40s; it is 50 here. Patient's blood pressure was 

also noted to be soft at the nursing facility although she is actually somewhat 

hypertensive here. Patient was reportedly in her normal state of health 

yesterday and is resting comfortably in absolutely no distress upon initial 

assessment here in the emergency department. She is sleepy but awakens easily, 

answers questions pleasantly but does not provide much history secondary to 

dementia. She denies pain anywhere.





Allergies and Home Medications


Allergies


Coded Allergies:  


     diazepam (Unverified  Adverse Reaction, Unknown, 8/10/19)





Home Medications


Acetaminophen 500 Mg Tablet, 500-1,000 MG PO Q6HRS MILD PAIN PRN, (Reported)


Aspirin 81 Mg Tabec, 81 MG PO DAILY, (Reported)


Atorvastatin Calcium 40 Mg Tablet, 40 MG PO DAILY, (Reported)


Benazepril Hcl 20 Mg Tablet, 20 MG PO BID, (Reported)


Clopidogrel Bisulfate 75 Mg Tablet, 75 MG PO DAILY, (Reported)


Clopidogrel Bisulfate 75 Mg Tablet, 75 MG PO DAILY, (Reported)


Docusate Sodium 100 Mg Capsule, 100 MG PO BID, (Reported)


Furosemide 40 Mg Tablet, 40 MG PO DAILY, (Reported)


Furosemide 20 Mg Tablet, 40 MG PO DAILY PRN for AIR HUNGER


   Prescribed by: AUSTIN SARAVIA on 8/10/19 1128


Gabapentin 100 Mg Capsule, 100 MG PO TID, (Reported)


Glimepiride 2 Mg Tab, 2 MG PO DAILY, (Reported)


Insulin Determir 1,000 Units/10 Ml Soln, 60 UNITS SQ HS, (Reported)


Insulin Determir 1,000 Units/10 Ml Soln, 50 UNITS SQ DAILY, (Reported)


Magnesium Hydroxide 400 Mg/5 Ml Oral.susp, 2,400 MG PO DAILY, (Reported)


Metoprolol Tartrate 100 Mg Tablet, 100 MG PO BID, (Reported)


Pantoprazole Sodium 20 Mg Tablet.dr, 20 MG PO DAILY, (Reported)


Paroxetine HCl 30 Mg Tablet, 30 MG PO DAILY, (Reported)


Potassium Chloride 10 Meq Tablet.er, 10 MEQ PO DAILY, (Reported)


Sulfamethoxazole/Trimethoprim 1 Each Tablet, 1 EACH PO BID, (Reported)





Patient Home Medication List


Home Medication List Reviewed:  Yes





Review of Systems


Review of Systems


Constitutional:  see HPI





All Other Systems Reviewed


Negative Unless Noted:  Yes (Negative excepted noted.)





Past Medical-Social-Family Hx


Past Med/Social Hx:  Reviewed Nursing Past Med/Soc Hx


Patient Social History


Recent Foreign Travel:  No


Contact w/Someone Who Travel:  No


Recent Hopitalizations:  No





Immunizations Up To Date


Date of Influenza Vaccine:  Oct 1, 2011





Seasonal Allergies


Seasonal Allergies:  No





Past Medical History


Surgeries:  Yes (JOINT REPLACEMENT BOTH KNEES, BACK SURGERY)


Gallbladder, Joint Replacement, Orthopedic


Respiratory:  Yes (chronic SOA, wear O2 to keep > 92%)


Cardiac:  Yes (chronic ischemic heart dz, )


High Cholesterol, Hypertension


Neurological:  Yes


Stroke


Reproductive Disorders:  No


Genitourinary:  Yes


UTI-Chronic


Gastrointestinal:  Yes (Hx of dysphagia/aphasia post CVA)


Gastroesophageal Reflux


Musculoskeletal:  Yes (hx back surgery and knee replacements)


Arthritis


Endocrine:  Yes (DM Type II)


Diabetes, Insulin dep


HEENT:  No


Cancer:  No


Psychosocial:  Yes


Anxiety, Depression


Blood Disorders:  No





Family Medical History


Reviewed Nursing Family Hx





Physical Exam


Vital Signs





Vital Signs - First Documented








 7/12/20





 03:53


 


Temp 35.3


 


Pulse 50


 


Resp 16


 


B/P (MAP) 167/74 (105)


 


O2 Delivery Nasal Cannula


 


O2 Flow Rate 2.00





Capillary Refill :


Height, Weight, BMI


Height: 5'1.00"


Weight: 225lbs. 0oz. 102.938029ll;  BMI


Method:Estimated


General Appearance:  No Apparent Distress


Comments


This is an elderly  female appearing nontoxic and in no acute distress.

Head is normocephalic and atraumatic. Neck is supple and nontender. Oropharynx 

is moist. Lungs are clear to auscultation at all stations. There is a normal S1 

and S2 without rubs or gallops and capillary refill is appropriate, less than 2 

seconds globally. There is a bradycardic, regular rhythm. Abdomen is soft, 

nontender and nondistended. Evaluation of the back reveals no erythema, warmth, 

swelling, skin breakdown or other acute abnormalities. Evaluation of the groin 

reveals no skin breakdown, rashes or other external genitourinary abnormalities.

Skin is warm and dry without cyanosis, clubbing or edema. Psychiatrically, the 

patient demonstrates appropriate mood and affect and is alert. Bilateral upper 

and lower extremities with 2+ pulses distally, equal bilaterally. Hands and feet

are warm and well-perfused.





Progress/Results/Core Measures


Suspected Sepsis


SIRS


Temperature: 


Pulse:  


Respiratory Rate: 


 


Laboratory Tests


7/12/20 04:09: White Blood Count 10.1


Blood Pressure  / 


Mean: 


 





Laboratory Tests


7/12/20 04:09: 


Creatinine 0.98, INR Comment 0.9, Platelet Count 304, Total Bilirubin 0.9








Results/Orders


Lab Results





Laboratory Tests








Test


 7/12/20


04:09 Range/Units


 


 


White Blood Count


 10.1 


 4.3-11.0


10^3/uL


 


Red Blood Count


 4.80 


 4.35-5.85


10^6/uL


 


Hemoglobin 14.9  11.5-16.0  G/DL


 


Hematocrit 46  35-52  %


 


Mean Corpuscular Volume 96  80-99  FL


 


Mean Corpuscular Hemoglobin 31  25-34  PG


 


Mean Corpuscular Hemoglobin


Concent 32 


 32-36  G/DL





 


Red Cell Distribution Width 13.1  10.0-14.5  %


 


Platelet Count


 304 


 130-400


10^3/uL


 


Mean Platelet Volume 8.8  7.4-10.4  FL


 


Neutrophils (%) (Auto) 70  42-75  %


 


Lymphocytes (%) (Auto) 22  12-44  %


 


Monocytes (%) (Auto) 5  0-12  %


 


Eosinophils (%) (Auto) 2  0-10  %


 


Basophils (%) (Auto) 0  0-10  %


 


Neutrophils # (Auto) 7.1  1.8-7.8  X 10^3


 


Lymphocytes # (Auto) 2.2  1.0-4.0  X 10^3


 


Monocytes # (Auto) 0.5  0.0-1.0  X 10^3


 


Eosinophils # (Auto)


 0.2 


 0.0-0.3


10^3/uL


 


Basophils # (Auto)


 0.0 


 0.0-0.1


10^3/uL


 


Prothrombin Time 12.3  12.2-14.7  SEC


 


INR Comment 0.9  0.8-1.4  


 


Activated Partial


Thromboplast Time 27 


 24-35  SEC





 


Sodium Level 144  135-145  MMOL/L


 


Potassium Level 4.4  3.6-5.0  MMOL/L


 


Chloride Level 102    MMOL/L


 


Carbon Dioxide Level 33 H 21-32  MMOL/L


 


Anion Gap 9  5-14  MMOL/L


 


Blood Urea Nitrogen 20 H 7-18  MG/DL


 


Creatinine


 0.98 


 0.60-1.30


MG/DL


 


Estimat Glomerular Filtration


Rate 55 


  





 


BUN/Creatinine Ratio 20   


 


Glucose Level 62 L   MG/DL


 


Calcium Level 9.5  8.5-10.1  MG/DL


 


Corrected Calcium 9.7  8.5-10.1  MG/DL


 


Total Bilirubin 0.9  0.1-1.0  MG/DL


 


Aspartate Amino Transf


(AST/SGOT) 27 


 5-34  U/L





 


Alanine Aminotransferase


(ALT/SGPT) 24 


 0-55  U/L





 


Alkaline Phosphatase 196 H   U/L


 


Troponin I < 0.30  <0.30  NG/ML


 


Total Protein 6.7  6.4-8.2  GM/DL


 


Albumin 3.8  3.2-4.5  GM/DL








My Orders





Orders - RAYMOND GRANDA MD


Cbc With Automated Diff (7/12/20 03:57)


Comprehensive Metabolic Panel (7/12/20 03:57)


Troponin I Fs (7/12/20 03:57)


Ekg Tracing (7/12/20 03:57)


Probnp Fs (7/12/20 03:57)


Protime With Inr (7/12/20 03:57)


Partial Thromboplastin Time (7/12/20 03:57)


Chest 1 View Ap/Pa Only (7/12/20 03:57)


Ekg Tracing (7/12/20 04:09)





Vital Signs/I&O











 7/12/20





 03:53


 


Temp 35.3


 


Pulse 50


 


Resp 16


 


B/P (MAP) 167/74 (105)


 


O2 Delivery Nasal Cannula


 


O2 Flow Rate 2.00





Capillary Refill :


Progress Note :  


   Time:  04:08


Progress Note


Elderly nursing home resident who presents for evaluation of bradycardia. Does 

not appear symptomatic at this time; is actually somewhat hypertensive, oxygen 

saturation is appears to be at baseline and patient is mentating consistent with

her reported baseline. She denies pain anywhere. We will check labs and EKG and 

chest x-ray and will then reevaluate. Low threshold for observation admission to

[facility] given age and extensive comorbidities.





0440: Patient is resting comfortably in no acute distress upon reassessment. 

Rhythm on the cardiac monitor continues to be sinus bradycardia with a rate that

drops as low as 46 bpm. Blood pressure remains somewhat elevated, although not 

overly so. Labs and chest x-ray are unrevealing of any evidence of acute 

process. As above, though the patient appears comfortable and is stable, in view

of extensive cardiorespiratory comorbidities and advanced age, we will bring in 

to [facility] on an observation basis for continued observation on 

telemetry, possible adjustment of her metoprolol dose, cardiology consultation 

and further care. Case is discussed with Dr. Hercules who graciously accepts the 

patient in transfer to [facility].





ECG


Comment


Sinus rhythm, rate 50, no acute ST elevation or depression, ND 59, , QTC 

470, EP interpretation.





Departure


Impression





   Primary Impression:  


   Sinus bradycardia


Disposition:  09 ADMITTED AS INPATIENT


Condition:  Stable





Departure-Patient Inst.


Referrals:  


AUSTIN WELCH MD (PCP/Family)


Primary Care Physician











RAYMOND GRANDA MD              Jul 12, 2020 04:04

## 2020-07-12 NOTE — DIAGNOSTIC IMAGING REPORT
EXAMINATION: Chest radiograph, portable AP view.



DATE: 7/12/2020 4:19 AM hours.



INDICATION: 75-year-old female, bradycardia.



COMPARISON:  August 10, 2019.



FINDINGS: Stable cardiomegaly and overall appearance of the

cardiomediastinal silhouette given differences in patient

positioning. There is no identified pneumothorax. There is no

large pleural effusion. There is no definite focal airspace

consolidation. There are advanced bilateral glenohumeral

arthritic changes. There is incompletely imaged lumbar spine

hardware. There are technical limitations of the exam relating to

patient body habitus and difficulties with exposure.



IMPRESSION: 

1. Cardiomegaly without identified acute cardiopulmonary

abnormality.



Dictated by: 



  Dictated on workstation # [id number]

## 2020-10-30 ENCOUNTER — HOSPITAL ENCOUNTER (EMERGENCY)
Dept: HOSPITAL 75 - ER FS | Age: 75
Discharge: HOME | End: 2020-10-30
Payer: MEDICARE

## 2020-10-30 VITALS — DIASTOLIC BLOOD PRESSURE: 75 MMHG | SYSTOLIC BLOOD PRESSURE: 148 MMHG

## 2020-10-30 DIAGNOSIS — K21.9: ICD-10-CM

## 2020-10-30 DIAGNOSIS — E78.00: ICD-10-CM

## 2020-10-30 DIAGNOSIS — Z79.4: ICD-10-CM

## 2020-10-30 DIAGNOSIS — Z88.8: ICD-10-CM

## 2020-10-30 DIAGNOSIS — E11.9: ICD-10-CM

## 2020-10-30 DIAGNOSIS — Z79.82: ICD-10-CM

## 2020-10-30 DIAGNOSIS — F32.9: ICD-10-CM

## 2020-10-30 DIAGNOSIS — I10: ICD-10-CM

## 2020-10-30 DIAGNOSIS — F41.9: ICD-10-CM

## 2020-10-30 DIAGNOSIS — N39.0: Primary | ICD-10-CM

## 2020-10-30 DIAGNOSIS — Z20.828: ICD-10-CM

## 2020-10-30 LAB
ALBUMIN SERPL-MCNC: 4 GM/DL (ref 3.2–4.5)
ALP SERPL-CCNC: 208 U/L (ref 40–136)
ALT SERPL-CCNC: 27 U/L (ref 0–55)
APTT PPP: YELLOW S
BACTERIA #/AREA URNS HPF: (no result) /HPF
BASOPHILS # BLD AUTO: 0 10^3/UL (ref 0–0.1)
BASOPHILS NFR BLD AUTO: 0 % (ref 0–10)
BASOPHILS NFR BLD MANUAL: 0 %
BILIRUB SERPL-MCNC: 1.3 MG/DL (ref 0.1–1)
BILIRUB UR QL STRIP: NEGATIVE
BUN/CREAT SERPL: 21
CALCIUM SERPL-MCNC: 9.3 MG/DL (ref 8.5–10.1)
CHLORIDE SERPL-SCNC: 101 MMOL/L (ref 98–107)
CO2 SERPL-SCNC: 27 MMOL/L (ref 21–32)
CREAT SERPL-MCNC: 0.75 MG/DL (ref 0.6–1.3)
EOSINOPHIL # BLD AUTO: 0 10^3/UL (ref 0–0.3)
EOSINOPHIL NFR BLD AUTO: 0 % (ref 0–10)
EOSINOPHIL NFR BLD MANUAL: 1 %
FIBRINOGEN PPP-MCNC: (no result) MG/DL
GFR SERPLBLD BASED ON 1.73 SQ M-ARVRAT: > 60 ML/MIN
GLUCOSE SERPL-MCNC: 285 MG/DL (ref 70–105)
GLUCOSE UR STRIP-MCNC: (no result) MG/DL
HCT VFR BLD CALC: 41 % (ref 35–52)
HGB BLD-MCNC: 13 G/DL (ref 11.5–16)
KETONES UR QL STRIP: NEGATIVE
LEUKOCYTE ESTERASE UR QL STRIP: (no result)
LIPASE SERPL-CCNC: 16 U/L (ref 8–78)
LYMPHOCYTES # BLD AUTO: 0.4 X 10^3 (ref 1–4)
LYMPHOCYTES NFR BLD AUTO: 3 % (ref 12–44)
MANUAL DIFFERENTIAL PERFORMED BLD QL: YES
MCH RBC QN AUTO: 30 PG (ref 25–34)
MCHC RBC AUTO-ENTMCNC: 32 G/DL (ref 32–36)
MCV RBC AUTO: 95 FL (ref 80–99)
MONOCYTES # BLD AUTO: 0.2 X 10^3 (ref 0–1)
MONOCYTES NFR BLD AUTO: 2 % (ref 0–12)
MONOCYTES NFR BLD: 1 %
NEUTROPHILS # BLD AUTO: 10.7 X 10^3 (ref 1.8–7.8)
NEUTROPHILS NFR BLD AUTO: 95 % (ref 42–75)
NEUTS BAND NFR BLD MANUAL: 74 %
NEUTS BAND NFR BLD: 22 %
NITRITE UR QL STRIP: NEGATIVE
PH UR STRIP: 6 [PH] (ref 5–9)
PLATELET # BLD: 263 10^3/UL (ref 130–400)
PMV BLD AUTO: 9.1 FL (ref 7.4–10.4)
POTASSIUM SERPL-SCNC: 4.3 MMOL/L (ref 3.6–5)
PROT SERPL-MCNC: 7 GM/DL (ref 6.4–8.2)
PROT UR QL STRIP: NEGATIVE
RBC #/AREA URNS HPF: (no result) /HPF
RBC MORPH BLD: NORMAL
SODIUM SERPL-SCNC: 140 MMOL/L (ref 135–145)
SP GR UR STRIP: 1.01 (ref 1.02–1.02)
SQUAMOUS #/AREA URNS HPF: (no result) /HPF
VARIANT LYMPHS NFR BLD MANUAL: 2 %
WBC # BLD AUTO: 11.3 10^3/UL (ref 4.3–11)
WBC #/AREA URNS HPF: >100 /HPF

## 2020-10-30 PROCEDURE — 87040 BLOOD CULTURE FOR BACTERIA: CPT

## 2020-10-30 PROCEDURE — 85027 COMPLETE CBC AUTOMATED: CPT

## 2020-10-30 PROCEDURE — 83690 ASSAY OF LIPASE: CPT

## 2020-10-30 PROCEDURE — 99284 EMERGENCY DEPT VISIT MOD MDM: CPT

## 2020-10-30 PROCEDURE — 81000 URINALYSIS NONAUTO W/SCOPE: CPT

## 2020-10-30 PROCEDURE — 87635 SARS-COV-2 COVID-19 AMP PRB: CPT

## 2020-10-30 PROCEDURE — 87077 CULTURE AEROBIC IDENTIFY: CPT

## 2020-10-30 PROCEDURE — 74022 RADEX COMPL AQT ABD SERIES: CPT

## 2020-10-30 PROCEDURE — 87088 URINE BACTERIA CULTURE: CPT

## 2020-10-30 PROCEDURE — 36415 COLL VENOUS BLD VENIPUNCTURE: CPT

## 2020-10-30 PROCEDURE — 87804 INFLUENZA ASSAY W/OPTIC: CPT

## 2020-10-30 PROCEDURE — 85007 BL SMEAR W/DIFF WBC COUNT: CPT

## 2020-10-30 PROCEDURE — 86141 C-REACTIVE PROTEIN HS: CPT

## 2020-10-30 PROCEDURE — 74177 CT ABD & PELVIS W/CONTRAST: CPT

## 2020-10-30 PROCEDURE — 80053 COMPREHEN METABOLIC PANEL: CPT

## 2020-10-30 NOTE — ED GI
General


Stated Complaint:  N/V HYPERTENSION


Source of Information:  Patient, EMS, Nursing Home Records





History of Present Illness


Date Seen by Provider:  Oct 30, 2020


Time Seen by Provider:  11:58


Initial Comments


75-year-old female brought in by EMS. Patient has a dementia patient that 

resides at a nursing home. They sent her out due to episode of nausea and 

vomiting. While she was vomiting her blood pressure was high but after she quit 

vomiting her went back to normal. She was given Zofran in route by EMS. Patient 

history is limited based on her dementia. It does sound like his been a few days

that she's had a bowel movement. She she doesn't complain of abdominal 

tenderness except for when palpated. No reports of fevers chills cough or other 

systemic complaints.





Allergies and Home Medications


Allergies


Coded Allergies:  


     diazepam (Unverified  Adverse Reaction, Unknown, 8/10/19)





Home Medications


Acetaminophen 500 Mg Tablet, 500-1,000 MG PO Q6HRS MILD PAIN PRN, (Reported)


Amlodipine Besylate 5 Mg Tablet, 5 MG PO DAILY


   Prescribed by: MARIBEL CASTILLO on 20 1602


Aspirin 81 Mg Tabec, 81 MG PO DAILY, (Reported)


Atorvastatin Calcium 40 Mg Tablet, 40 MG PO DAILY, (Reported)


Benazepril Hcl 20 Mg Tablet, 20 MG PO BID, (Reported)


Clopidogrel Bisulfate 75 Mg Tablet, 75 MG PO DAILY, (Reported)


Docusate Sodium 100 Mg Capsule, 100 MG PO BID, (Reported)


Furosemide 40 Mg Tablet, 40 MG PO DAILY, (Reported)


Furosemide 20 Mg Tablet, 40 MG PO DAILY PRN for AIR HUNGER


   Prescribed by: AUSTIN SARAVIA on 8/10/19 1128


Gabapentin 100 Mg Capsule, 100 MG PO TID, (Reported)


Glimepiride 2 Mg Tab, 2 MG PO DAILY, (Reported)


Insulin Determir 1,000 Units/10 Ml Soln, 60 UNITS SQ HS, (Reported)


Insulin Determir 1,000 Units/10 Ml Soln, 50 UNITS SQ DAILY, (Reported)


Magnesium Hydroxide 400 Mg/5 Ml Oral.susp, 2,400 MG PO DAILY, (Reported)


Pantoprazole Sodium 20 Mg Tablet.dr, 20 MG PO DAILY, (Reported)


Paroxetine HCl 30 Mg Tablet, 30 MG PO DAILY, (Reported)


Potassium Chloride 10 Meq Tablet.er, 10 MEQ PO DAILY, (Reported)





Patient Home Medication List


Home Medication List Reviewed:  Yes





Review of Systems


Review of Systems


Constitutional:  no symptoms reported; No chills, No fever


EENTM:  No Symptoms Reported


Respiratory:  No Symptoms Reported, Cough


Cardiovascular:  No Symptoms Reported


Gastrointestinal:  Denies Diarrhea; Nausea, Vomiting, Other (tenderness with 

palpation)


Genitourinary:  No Symptoms Reported


Musculoskeletal:  no symptoms reported


Skin:  no symptoms reported





Past Medical-Social-Family Hx


Patient Social History


Recent Hopitalizations:  No





Immunizations Up To Date


Date of Influenza Vaccine:  Oct 1, 2011





Seasonal Allergies


Seasonal Allergies:  No





Past Medical History


Surgeries:  Yes (JOINT REPLACEMENT BOTH KNEES, BACK SURGERY)


Gallbladder, Joint Replacement, Orthopedic


Respiratory:  Yes (chronic SOA, wear O2 to keep > 92%)


Cardiac:  Yes (chronic ischemic heart dz, )


High Cholesterol, Hypertension


Neurological:  Yes


Stroke


Reproductive Disorders:  No


Genitourinary:  Yes


UTI-Chronic


Gastrointestinal:  Yes (Hx of dysphagia/aphasia post CVA)


Gastroesophageal Reflux


Musculoskeletal:  Yes (hx back surgery and knee replacements)


Arthritis


Endocrine:  Yes (DM Type II)


Diabetes, Insulin dep


HEENT:  No


Cancer:  No


Psychosocial:  Yes


Anxiety, Depression


Blood Disorders:  No





Family Medical History





Patient reports no known family medical history.





Physical Exam


Vital Signs





Vital Signs - First Documented








 10/30/20





 11:59


 


Temp 38.8


 


Pulse 119


 


Resp 29


 


B/P (MAP) 150/57 (88)


 


Pulse Ox 92


 


O2 Delivery Room Air





Capillary Refill :


Height/Weight/BMI


Height: 5'1.00"


Weight: 225lbs. 0oz. 102.560226ou; 207.03 BMI


Method:Estimated


General Appearance:  no apparent distress


Neck:  supple, normal inspection


Respiratory:  lungs clear, normal breath sounds, no respiratory distress


Cardiovascular:  normal peripheral pulses, regular rate, rhythm, other 

(bilateral 2+ pedal edema-baseline)


Gastrointestinal:  soft; No distended, No guarding, No rebound; tenderness


Extremities:  normal range of motion


Neurologic/Psychiatric:  alert


Skin:  normal color





Progress/Results/Core Measures


Results/Orders


Lab Results





Laboratory Tests








Test


 10/30/20


12:35 10/30/20


13:20 10/30/20


13:30 Range/Units


 


 


White Blood Count


 11.3 H


 


 


 4.3-11.0


10^3/uL


 


Red Blood Count


 4.27 L


 


 


 4.35-5.85


10^6/uL


 


Hemoglobin 13.0    11.5-16.0  G/DL


 


Hematocrit 41    35-52  %


 


Mean Corpuscular Volume 95    80-99  FL


 


Mean Corpuscular Hemoglobin 30    25-34  PG


 


Mean Corpuscular Hemoglobin


Concent 32 


 


 


 32-36  G/DL





 


Red Cell Distribution Width 13.1    10.0-14.5  %


 


Platelet Count


 263 


 


 


 130-400


10^3/uL


 


Mean Platelet Volume 9.1    7.4-10.4  FL


 


Immature Granulocyte % (Auto) 0     %


 


Neutrophils (%) (Auto) 95 H   42-75  %


 


Lymphocytes (%) (Auto) 3 L   12-44  %


 


Monocytes (%) (Auto) 2    0-12  %


 


Eosinophils (%) (Auto) 0    0-10  %


 


Basophils (%) (Auto) 0    0-10  %


 


Neutrophils # (Auto) 10.7 H   1.8-7.8  X 10^3


 


Lymphocytes # (Auto) 0.4 L   1.0-4.0  X 10^3


 


Monocytes # (Auto) 0.2    0.0-1.0  X 10^3


 


Eosinophils # (Auto)


 0.0 


 


 


 0.0-0.3


10^3/uL


 


Basophils # (Auto)


 0.0 


 


 


 0.0-0.1


10^3/uL


 


Immature Granulocyte # (Auto)


 0.0 


 


 


 0.0-0.1


10^3/uL


 


Neutrophils % (Manual) 74     %


 


Lymphocytes % (Manual) 2     %


 


Monocytes % (Manual) 1     %


 


Eosinophils % (Manual) 1     %


 


Basophils % (Manual) 0     %


 


Band Neutrophils 22     %


 


Blood Morphology Comment NORMAL     


 


Sodium Level 140    135-145  MMOL/L


 


Potassium Level 4.3    3.6-5.0  MMOL/L


 


Chloride Level 101      MMOL/L


 


Carbon Dioxide Level 27    21-32  MMOL/L


 


Anion Gap 12    5-14  MMOL/L


 


Blood Urea Nitrogen 16    7-18  MG/DL


 


Creatinine


 0.75 


 


 


 0.60-1.30


MG/DL


 


Estimat Glomerular Filtration


Rate > 60 


 


 


  





 


BUN/Creatinine Ratio 21     


 


Glucose Level 285 H     MG/DL


 


Calcium Level 9.3    8.5-10.1  MG/DL


 


Corrected Calcium 9.3    8.5-10.1  MG/DL


 


Total Bilirubin 1.3 H   0.1-1.0  MG/DL


 


Aspartate Amino Transf


(AST/SGOT) 41 H


 


 


 5-34  U/L





 


Alanine Aminotransferase


(ALT/SGPT) 27 


 


 


 0-55  U/L





 


Alkaline Phosphatase 208 H     U/L


 


C-Reactive Protein 0.98 H   <0.50  MG/DL


 


Total Protein 7.0    6.4-8.2  GM/DL


 


Albumin 4.0    3.2-4.5  GM/DL


 


Lipase 16    8-78  U/L


 


Urine Color   YELLOW   


 


Urine Clarity   CLOUDY H  


 


Urine pH   6.0  5-9  


 


Urine Specific Gravity   1.015 L 1.016-1.022  


 


Urine Protein   NEGATIVE  NEGATIVE  


 


Urine Glucose (UA)   3+ H NEGATIVE  


 


Urine Ketones   NEGATIVE  NEGATIVE  


 


Urine Nitrite   NEGATIVE  NEGATIVE  


 


Urine Bilirubin   NEGATIVE  NEGATIVE  


 


Urine Urobilinogen   0.2  < = 1.0  MG/DL


 


Urine Leukocyte Esterase   2+ H NEGATIVE  


 


Urine RBC (Auto)   1+ H NEGATIVE  


 


Urine RBC   5-10 H  /HPF


 


Urine WBC   >100 H  /HPF


 


Urine Squamous Epithelial


Cells 


 


 NONE 


  /HPF





 


Urine Crystals   NONE   /LPF


 


Urine Bacteria   LARGE H  /HPF


 


Urine Casts   NONE   /LPF


 


Urine Mucus   NEGATIVE   /LPF


 


Urine Culture Indicated   YES   








Micro Results





Microbiology


10/30/20 Influenza Types A,B Antigen ([facility]) - Final, Complete


           





My Orders





Orders - ADIS PEGUERO DO


Cbc With Automated Diff (10/30/20 11:56)


Comprehensive Metabolic Panel (10/30/20 11:56)


Lipase (10/30/20 11:56)


Influenza A And B Antigens (10/30/20 11:56)


Crp Fs (10/30/20 11:56)


Acute Abd Series (10/30/20 11:56)


Ns Iv 1000 Ml (Sodium Chloride 0.9%) (10/30/20 11:56)


Ua Culture If Indicated (10/30/20 12:01)


Coronavirus Sars-Cov-2 So  (10/30/20 12:04)


Acetaminophen  Tablet (Tylenol  Tablet) (10/30/20 12:04)


Manual Differential (10/30/20 12:35)


Ct Abdomen/Pelvis W (10/30/20 13:42)


Urine Culture (10/30/20 13:30)


Ceftriaxone  For Iv Use (Rocephin  For I (10/30/20 14:17)


Iohexol Injection (Omnipaque 350 Mg/Ml 1 (10/30/20 14:30)


Received Contrast (Hold Metformin- Contr (10/30/20 14:30)


Sodium Chloride Flush (Catheter Flush Sy (10/30/20 14:30)


Ns (Ivpb) (Sodium Chloride 0.9% Ivpb Bag (10/30/20 14:30)





Medications Given in ED





Current Medications








 Medications  Dose


 Ordered  Sig/Johnson


 Route  Start Time


 Stop Time Status Last Admin


Dose Admin


 


 Iohexol  100 ml  ONCE  ONCE


 IV  10/30/20 14:30


 10/30/20 14:31 DC 10/30/20 14:48


100 ML


 


 Sodium Chloride  10 ml  AS NEEDED  PRN


 IV  10/30/20 14:30


    10/30/20 14:48


10 ML


 


 Sodium Chloride  100 ml  ONCE  ONCE


 IV  10/30/20 14:30


 10/30/20 14:31 DC 10/30/20 14:48


100 ML








Vital Signs/I&O











 10/30/20





 11:59


 


Temp 38.8


 


Pulse 119


 


Resp 29


 


B/P (MAP) 150/57 (88)


 


Pulse Ox 92


 


O2 Delivery Room Air











Progress


Progress Note :  


   Time:  16:41


Progress Note


A shunt with a significant urinary tract infection. She was treated with 

Rocephin and fluids. Patient with no episodes of vomiting over here in the ER. 

CT showed some non-acute abnormalities. I called and discussed with patient's 

primary Dr. Welch. This time we will treat her as an outpatient basis, he will 

review the CT with her and the family determine if and when he further 

evaluation. Patient stable and will be discharged home





Diagnostic Imaging





   Diagonstic Imaging:  Xray, CT


   Plain Films/CT/US/NM/MRI:  abdomen


Comments


                 [facility] [place], [place]





NAME:   THEA MORRISON


[facility] REC#:   [id number]


ACCOUNT#:   [id number]


PT STATUS:   REG ER


:   1945


PHYSICIAN:   ADIS PEGUERO DO


ADMIT DATE:   10/30/20/ER FS


                                  ***Signed***


Date of Exam:10/30/20





ACUTE ABD SERIES








HISTORY: Fever, nausea and vomiting.





TECHNIQUE: Frontal view of the chest. Frontal, supine and


decubitus views of the abdomen.





COMPARISON: 2020





FINDINGS:





Lung volumes are mildly low. No focal consolidation is seen.


There is no pleural effusion or pneumothorax. There is stable


cardiomegaly.





Overall there is paucity of bowel gas, but no bowel gas


distention is seen. No large collection of free air is seen.


Fusion hardware is seen in the lumbar spine. Cholecystectomy


clips are seen in the right upper quadrant. There are


degenerative changes in the bilateral hips and spine.





IMPRESSION:


1. Low lung volumes with no acute pulmonary abnormality seen.


Stable cardiomegaly.


2. No bowel obstruction or large collection of free air.





Dictated by: 





  Dictated on workstation # [id number]








Dict:   10/30/20 [id number]


Trans:   10/30/20 1403


[facility] [id number]





Interpreted by:     ADE JOSE MD


Electronically signed by: ADE JOSE MD 10/30/20 1403





Departure


Impression





   Primary Impression:  


   Urinary tract infection


   Qualified Codes:  N30.01 - Acute cystitis with hematuria


Disposition:   HOME, SELF-CARE


Condition:  Stable





Departure-Patient Inst.


Referrals:  


AUSTIN WELCH MD (PCP/Family)


Primary Care Physician


Patient Instructions:  Urinary Tract Infection, Adult (DC)





Add. Discharge Instructions:  


Follow-up with Dr. Welch next week


Scripts


Ondansetron (Ondansetron Odt) 4 Mg Tab.rapdis


4 MG PO Q6H PRN for NAUSEA/VOMITING, #20 TAB 0 Refills


   Prov: ADIS PEGUERO DO         10/30/20 


Cephalexin (Cephalexin) 500 Mg Tablet


500 MG PO QID, #20 TAB 0 Refills


   Prov: ADIS PEGUERO DO         10/30/20











ADIS PEGUERO DO               Oct 30, 2020 12:01

## 2021-03-10 ENCOUNTER — HOSPITAL ENCOUNTER (OUTPATIENT)
Dept: HOSPITAL 75 - MLFS | Age: 76
End: 2021-03-10
Attending: PEDIATRICS
Payer: MEDICARE

## 2021-03-10 DIAGNOSIS — N39.0: Primary | ICD-10-CM

## 2021-03-10 LAB
APTT PPP: YELLOW S
BACTERIA #/AREA URNS HPF: (no result) /HPF
BILIRUB UR QL STRIP: NEGATIVE
FIBRINOGEN PPP-MCNC: (no result) MG/DL
GLUCOSE UR STRIP-MCNC: (no result) MG/DL
KETONES UR QL STRIP: NEGATIVE
LEUKOCYTE ESTERASE UR QL STRIP: (no result)
NITRITE UR QL STRIP: NEGATIVE
PH UR STRIP: 5.5 [PH] (ref 5–9)
PROT UR QL STRIP: NEGATIVE
RBC #/AREA URNS HPF: (no result) /HPF
SP GR UR STRIP: 1.02 (ref 1.02–1.02)
SQUAMOUS #/AREA URNS HPF: (no result) /HPF
WBC #/AREA URNS HPF: >100 /HPF

## 2021-03-10 PROCEDURE — 81000 URINALYSIS NONAUTO W/SCOPE: CPT

## 2021-03-10 PROCEDURE — 87088 URINE BACTERIA CULTURE: CPT

## 2021-04-16 ENCOUNTER — HOSPITAL ENCOUNTER (OUTPATIENT)
Dept: HOSPITAL 75 - LAB FS | Age: 76
End: 2021-04-16
Attending: PEDIATRICS
Payer: COMMERCIAL

## 2021-04-16 DIAGNOSIS — Z01.89: Primary | ICD-10-CM

## 2021-04-16 LAB
APTT PPP: YELLOW S
BACTERIA #/AREA URNS HPF: (no result) /HPF
BILIRUB UR QL STRIP: NEGATIVE
FIBRINOGEN PPP-MCNC: (no result) MG/DL
GLUCOSE UR STRIP-MCNC: (no result) MG/DL
KETONES UR QL STRIP: NEGATIVE
LEUKOCYTE ESTERASE UR QL STRIP: (no result)
NITRITE UR QL STRIP: NEGATIVE
PH UR STRIP: 5.5 [PH] (ref 5–9)
PROT UR QL STRIP: NEGATIVE
RBC #/AREA URNS HPF: (no result) /HPF
SP GR UR STRIP: 1.02 (ref 1.02–1.02)
SQUAMOUS #/AREA URNS HPF: (no result) /HPF
WBC #/AREA URNS HPF: (no result) /HPF

## 2021-04-16 PROCEDURE — 87186 SC STD MICRODIL/AGAR DIL: CPT

## 2021-04-16 PROCEDURE — 87088 URINE BACTERIA CULTURE: CPT

## 2021-04-16 PROCEDURE — 81000 URINALYSIS NONAUTO W/SCOPE: CPT

## 2021-04-16 PROCEDURE — 87077 CULTURE AEROBIC IDENTIFY: CPT

## 2021-11-15 ENCOUNTER — HOSPITAL ENCOUNTER (OUTPATIENT)
Dept: HOSPITAL 75 - LABNPT | Age: 76
End: 2021-11-15
Attending: PEDIATRICS
Payer: MEDICARE

## 2021-11-15 DIAGNOSIS — R30.0: Primary | ICD-10-CM

## 2021-11-15 DIAGNOSIS — R35.0: ICD-10-CM

## 2021-11-15 LAB
APTT PPP: YELLOW S
BACTERIA #/AREA URNS HPF: (no result) /HPF
BILIRUB UR QL STRIP: NEGATIVE
FIBRINOGEN PPP-MCNC: (no result) MG/DL
GLUCOSE UR STRIP-MCNC: NEGATIVE MG/DL
KETONES UR QL STRIP: NEGATIVE
LEUKOCYTE ESTERASE UR QL STRIP: (no result)
NITRITE UR QL STRIP: NEGATIVE
PH UR STRIP: 6 [PH] (ref 5–9)
PROT UR QL STRIP: NEGATIVE
RBC #/AREA URNS HPF: (no result) /HPF
SP GR UR STRIP: 1.02 (ref 1.02–1.02)
SQUAMOUS #/AREA URNS HPF: (no result) /HPF
WBC #/AREA URNS HPF: (no result) /HPF

## 2021-11-15 PROCEDURE — 87088 URINE BACTERIA CULTURE: CPT

## 2021-11-15 PROCEDURE — 81000 URINALYSIS NONAUTO W/SCOPE: CPT

## 2022-01-31 ENCOUNTER — HOSPITAL ENCOUNTER (EMERGENCY)
Dept: HOSPITAL 75 - ER FS | Age: 77
Discharge: HOME | End: 2022-01-31
Payer: MEDICARE

## 2022-01-31 VITALS — SYSTOLIC BLOOD PRESSURE: 174 MMHG | DIASTOLIC BLOOD PRESSURE: 70 MMHG

## 2022-01-31 VITALS — BODY MASS INDEX: 35.67 KG/M2 | HEIGHT: 60.98 IN | WEIGHT: 188.94 LBS

## 2022-01-31 DIAGNOSIS — E78.00: ICD-10-CM

## 2022-01-31 DIAGNOSIS — K21.9: ICD-10-CM
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
## 2022-01-31 DIAGNOSIS — I10: ICD-10-CM

## 2022-01-31 DIAGNOSIS — E11.9: ICD-10-CM

## 2022-01-31 DIAGNOSIS — Z79.899: ICD-10-CM

## 2022-01-31 DIAGNOSIS — Z79.84: ICD-10-CM

## 2022-01-31 DIAGNOSIS — S50.12XA: Primary | ICD-10-CM

## 2022-01-31 DIAGNOSIS — Z79.4: ICD-10-CM

## 2022-01-31 DIAGNOSIS — F41.9: ICD-10-CM

## 2022-01-31 DIAGNOSIS — M25.532: ICD-10-CM

## 2022-01-31 DIAGNOSIS — F32.A: ICD-10-CM

## 2022-01-31 NOTE — ED FALL/INJURY
General


Stated Complaint:  FALL





History of Present Illness


Date Seen by Provider:  Jan 31, 2022


Time Seen by Provider:  05:22


Initial Comments


76-year-old female sent in by EMS following a fall out of bed while she was 

sleeping.  Patient complains of left forearm pain near the elbow and on the 

wrist.  She does have some mild swelling but is unsure if this is chronic or 

new.  Patient denies any other injury or pain.





Allergies and Home Medications


Allergies


Coded Allergies:  


     diazepam (Unverified  Adverse Reaction, Unknown, 8/10/19)





Patient Home Medication List


Home Medication List Reviewed:  Yes


Acetaminophen (Tylenol) 500 Mg Tablet, 500-1,000 MG PO Q6HRS MILD PAIN PRN, 

(Reported)


   Entered as Reported by: COREY VAUGHN on 7/13/12 0657


Amlodipine Besylate (Amlodipine Besylate) 5 Mg Tablet, 5 MG PO DAILY


   Prescribed by: MARIBEL CASTILLO on 7/12/20 1602


Aspirin (Aspirin Ec 81 Mg) 81 Mg Tabec, 81 MG PO DAILY, (Reported)


   Entered as Reported by: COREY VAUGHN on 7/13/12 0657


Atorvastatin Calcium (Lipitor) 40 Mg Tablet, 40 MG PO DAILY, (Reported)


   Entered as Reported by: DONA HARRISON on 8/10/19 1026


Benazepril Hcl (Lotensin 20 Mg) 20 Mg Tablet, 20 MG PO BID, (Reported)


   Entered as Reported by: COREY VAUGHN on 7/13/12 0657


Cephalexin (Cephalexin) 500 Mg Tablet, 500 MG PO QID


   Prescribed by: ADIS PEGUERO on 10/30/20 1646


Clopidogrel Bisulfate (Clopidogrel) 75 Mg Tablet, 75 MG PO DAILY, (Reported)


   Entered as Reported by: DONA HARRISON on 8/10/19 1025


Docusate Sodium (Colace) 100 Mg Capsule, 100 MG PO BID, (Reported)


   Entered as Reported by: DONA HARRISON on 8/10/19 1025


Furosemide (Furosemide) 40 Mg Tablet, 40 MG PO DAILY, (Reported)


   Entered as Reported by: DONA HARRISON on 8/10/19 1016


Furosemide (Furosemide) 20 Mg Tablet, 40 MG PO DAILY PRN for AIR HUNGER


   Prescribed by: AUSTIN SARAVIA on 8/10/19 1128


Gabapentin (Gabapentin) 100 Mg Capsule, 100 MG PO TID, (Reported)


   Entered as Reported by: DONA HARRISON on 8/10/19 1025


Gentamicin Sulfate (Gentamicin Sulfate) 5 Ml Drops, 5 ML OP, (Reported)


   Entered as Reported by: DONA HARRISON on 8/10/19 1045


Glimepiride (Amaryl) 2 Mg Tab, 2 MG PO DAILY, (Reported)


   Entered as Reported by: DONA HARRISON on 8/10/19 1025


Insulin Aspart (Novolog Pen) 100 Unit/1 Ml Insuln.pen, 8 UNIT SQ, (Reported)


   Entered as Reported by: COREY VAUGHN on 7/13/12 0657


Insulin Determir (Levemir) 1,000 Units/10 Ml Soln, 60 UNITS SQ HS, (Reported)


   Entered as Reported by: DONA HARRISON on 8/10/19 1025


Insulin Determir (Levemir) 1,000 Units/10 Ml Soln, 50 UNITS SQ DAILY, (Reported)


   Entered as Reported by: DONA HARRISON on 8/10/19 1026


Magnesium Hydroxide (Milk of Magnesia) 400 Mg/5 Ml Oral.susp, 2,400 MG PO DAILY,

(Reported)


   Entered as Reported by: DONA HARRISON on 8/10/19 1045


Ondansetron (Ondansetron Odt) 4 Mg Tab.rapdis, 4 MG PO Q6H PRN for 

NAUSEA/VOMITING


   Prescribed by: ADIS PEGUERO on 10/30/20 1646


Pantoprazole Sodium (Protonix) 20 Mg Tablet.dr, 20 MG PO DAILY, (Reported)


   Entered as Reported by: DONA HARRISON on 8/10/19 1045


Paroxetine HCl (Paroxetine HCl) 30 Mg Tablet, 30 MG PO DAILY, (Reported)


   Entered as Reported by: DONA HARRISON on 8/10/19 1016


Potassium Chloride (Klor-Con 10) 10 Meq Tablet.er, 10 MEQ PO DAILY, (Reported)


   Entered as Reported by: DONA HARRISON on 8/10/19 1025





Review of Systems


Review of Systems


Constitutional:  no symptoms reported


Ears, Nose, Mouth, Throat:  no symptoms reported


Respiratory:  no symptoms reported


Cardiovascular:  no symptoms reported


Gastrointestinal:  no symptoms reported


Genitourinary:  no symptoms reported


Musculoskeletal:  see HPI


Skin:  see HPI





Past Medical-Social-Family Hx


Seasonal Allergies


Seasonal Allergies:  No





Past Medical History


Surgeries:  Yes (JOINT REPLACEMENT BOTH KNEES, BACK SURGERY)


Gallbladder, Joint Replacement, Orthopedic


Respiratory:  Yes (chronic SOA, wear O2 to keep > 92%)


Cardiac:  Yes (chronic ischemic heart dz, )


High Cholesterol, Hypertension


Neurological:  Yes


Stroke


Reproductive Disorders:  No


Genitourinary:  Yes


UTI-Chronic


Gastrointestinal:  Yes (Hx of dysphagia/aphasia post CVA)


Gastroesophageal Reflux


Musculoskeletal:  Yes (hx back surgery and knee replacements)


Arthritis


Endocrine:  Yes (DM Type II)


Diabetes, Insulin dep


HEENT:  No


Cancer:  No


Psychosocial:  Yes


Anxiety, Depression


Blood Disorders:  No





Physical Exam


Vital Signs





Vital Signs - First Documented








 1/31/22





 05:19


 


Temp 37.0


 


Pulse 85


 


Resp 18


 


B/P (MAP) 174/70 (104)


 


Pulse Ox 100


 


O2 Delivery Room Air





Capillary Refill :


Height, Weight, BMI


Height: 5'1.00"


Weight: 225lbs. 0oz. [id number]; 207.03 BMI


Method:Estimated


General Appearance:  WD/WN, no apparent distress


HEENT:  PERRL/EOMI


Neck:  non-tender, full range of motion


Cardiovascular:  normal peripheral pulses, regular rate, rhythm


Respiratory:  lungs clear, normal breath sounds


Gastrointestinal:  non tender, soft


Extremities:  swelling (Left wrist, tenderness to palpation left wrist and left 

forearm.  Full range of motion)


Neurologic/Psychiatric:  no motor/sensory deficits, alert, normal mood/affect


Skin:  other (Mild erythema to the left forearm, wrist and hand with some mild 

swelling of the left wrist and hand)





Progress/Results/Core Measures


Results/Orders


My Orders





Orders - ADIS PEGUERO DO


Forearm 2 View Left (1/31/22 05:23)





Vital Signs/I&O











 1/31/22 1/31/22





 05:19 05:35


 


Temp 37.0 37.0


 


Pulse 85 85


 


Resp 18 18


 


B/P (MAP) 174/70 (104) 174/70 (104)


 


Pulse Ox 100 100


 


O2 Delivery Room Air Room Air











Departure


Impression





   Primary Impression:  


   Fall from bed, initial encounter


   Additional Impression:  


   Contusion of left forearm, initial encounter


Disposition:  01 HOME, SELF-CARE


Condition:  Stable





Departure-Patient Inst.


Referrals:  


AUSTIN WELCH MD (PCP/Family)


Primary Care Physician


Patient Instructions:  Contusion (DC)





Add. Discharge Instructions:  


Tylenol or ibuprofen as needed for discomfort


Ice to the affected area as needed for discomfort











ADIS PEGUERO DO               Jan 31, 2022 05:23

## 2022-01-31 NOTE — DIAGNOSTIC IMAGING REPORT
INDICATION: Fall with left forearm injury and pain.



AP and lateral views of the left forearm are obtained. There is

degenerative spurring about the elbow joint which limits

evaluation. There is no evidence of fracture. There is

demineralization at the wrist and distal forearm without evidence

of fracture line or focal lytic lesion.



IMPRESSION: Degenerative-type findings without acute abnormality

detected.



Dictated by: 



  Dictated on workstation # [id number]

## 2022-02-09 ENCOUNTER — HOSPITAL ENCOUNTER (OUTPATIENT)
Dept: HOSPITAL 75 - IHC | Age: 77
End: 2022-02-09
Attending: PEDIATRICS
Payer: MEDICARE

## 2022-02-09 DIAGNOSIS — E11.621: Primary | ICD-10-CM

## 2022-02-09 PROCEDURE — 87205 SMEAR GRAM STAIN: CPT

## 2022-02-09 PROCEDURE — 87070 CULTURE OTHR SPECIMN AEROBIC: CPT

## 2022-02-11 ENCOUNTER — HOSPITAL ENCOUNTER (EMERGENCY)
Dept: HOSPITAL 75 - ER FS | Age: 77
Discharge: HOME | End: 2022-02-11
Payer: MEDICARE

## 2022-02-11 VITALS
BODY MASS INDEX: 29.97 KG/M2 | HEIGHT: 65 IN | SYSTOLIC BLOOD PRESSURE: 130 MMHG | DIASTOLIC BLOOD PRESSURE: 50 MMHG | WEIGHT: 179.9 LBS

## 2022-02-11 DIAGNOSIS — K21.9: ICD-10-CM

## 2022-02-11 DIAGNOSIS — Z79.899: ICD-10-CM

## 2022-02-11 DIAGNOSIS — Z79.4: ICD-10-CM

## 2022-02-11 DIAGNOSIS — E11.9: ICD-10-CM

## 2022-02-11 DIAGNOSIS — Z79.84: ICD-10-CM

## 2022-02-11 DIAGNOSIS — F32.A: ICD-10-CM

## 2022-02-11 DIAGNOSIS — R04.0: Primary | ICD-10-CM

## 2022-02-11 DIAGNOSIS — I25.10: ICD-10-CM

## 2022-02-11 DIAGNOSIS — F41.9: ICD-10-CM

## 2022-02-11 DIAGNOSIS — E78.5: ICD-10-CM

## 2022-02-11 DIAGNOSIS — I10: ICD-10-CM

## 2022-02-11 DIAGNOSIS — Z79.82: ICD-10-CM

## 2022-02-11 LAB
BASOPHILS # BLD AUTO: 0 10^3/UL (ref 0–0.1)
BASOPHILS NFR BLD AUTO: 0 % (ref 0–10)
EOSINOPHIL # BLD AUTO: 0.2 10^3/UL (ref 0–0.3)
EOSINOPHIL NFR BLD AUTO: 3 % (ref 0–10)
HCT VFR BLD CALC: 30 % (ref 35–52)
HGB BLD-MCNC: 9.6 G/DL (ref 11.5–16)
LYMPHOCYTES # BLD AUTO: 2.2 10^3/UL (ref 1–4)
LYMPHOCYTES NFR BLD AUTO: 23 % (ref 12–44)
MANUAL DIFFERENTIAL PERFORMED BLD QL: NO
MCH RBC QN AUTO: 30 PG (ref 25–34)
MCHC RBC AUTO-ENTMCNC: 32 G/DL (ref 32–36)
MCV RBC AUTO: 94 FL (ref 80–99)
MONOCYTES # BLD AUTO: 0.6 10^3/UL (ref 0–1)
MONOCYTES NFR BLD AUTO: 6 % (ref 0–12)
NEUTROPHILS # BLD AUTO: 6.7 10^3/UL (ref 1.8–7.8)
NEUTROPHILS NFR BLD AUTO: 69 % (ref 42–75)
PLATELET # BLD: 396 10^3/UL (ref 130–400)
PMV BLD AUTO: 8.8 FL (ref 9–12.2)
WBC # BLD AUTO: 9.7 10^3/UL (ref 4.3–11)

## 2022-02-11 PROCEDURE — 99283 EMERGENCY DEPT VISIT LOW MDM: CPT

## 2022-02-11 PROCEDURE — 85025 COMPLETE CBC W/AUTO DIFF WBC: CPT

## 2022-02-11 PROCEDURE — 36415 COLL VENOUS BLD VENIPUNCTURE: CPT

## 2022-02-11 NOTE — ED EENT
History of Present Illness


General


Chief Complaint:  Nasal Problems


Stated Complaint:  NOSE BLEED


Nursing Triage Note:  


Patient was brought in via EMS for a nose bleed.  EMS states that the nose bleed




started yesterday and has been intermittent according to nursing home staff.  


Patient has minimal bleeding coming from the left nostril.


Source:  patient, EMS


Exam Limitations:  no limitations





History of Present Illness


Date Seen by Provider:  Feb 11, 2022


Time Seen by Provider:  04:56


Initial Comments


76-year-old female with past medical history of diabetes, hypertension, 

hyperlipidemia, CAD on Plavix coming in via EMS from the nursing home due to a 

nosebleed.  Started this morning and they called an ambulance, but it stopped on

their arrival so they did not bring her here.  Continued apparently throughout 

the day and they are unable to stop it so they called again around 20 hours 

later.  Denies any trauma, pain, lightheadedness, chest pain, shortness of 

breath, abdominal pain, nausea, vomiting, or any other concerns.





Allergies and Home Medications


Allergies


Coded Allergies:  


     diazepam (Unverified  Adverse Reaction, Unknown, 8/10/19)





Patient Home Medication List


Home Medication List Reviewed:  Yes


Acetaminophen (Tylenol) 500 Mg Tablet, 500-1,000 MG PO Q6HRS MILD PAIN PRN, 

(Reported)


   Entered as Reported by: COREY VAUGHN on 7/13/12 0657


Amlodipine Besylate (Amlodipine Besylate) 5 Mg Tablet, 5 MG PO DAILY


   Prescribed by: MARIBEL CASTILLO on 7/12/20 1602


Aspirin (Aspirin Ec 81 Mg) 81 Mg Tabec, 81 MG PO DAILY, (Reported)


   Entered as Reported by: COREY VAUGHN on 7/13/12 0657


Atorvastatin Calcium (Lipitor) 40 Mg Tablet, 40 MG PO DAILY, (Reported)


   Entered as Reported by: DONA HARRISON on 8/10/19 1026


Benazepril Hcl (Lotensin 20 Mg) 20 Mg Tablet, 20 MG PO BID, (Reported)


   Entered as Reported by: COREY VAUGHN on 7/13/12 0657


Cephalexin (Cephalexin) 500 Mg Tablet, 500 MG PO QID


   Prescribed by: ADIS PEGUERO on 10/30/20 1646


Clopidogrel Bisulfate (Clopidogrel) 75 Mg Tablet, 75 MG PO DAILY, (Reported)


   Entered as Reported by: DONA HARRISON on 8/10/19 1025


Docusate Sodium (Colace) 100 Mg Capsule, 100 MG PO BID, (Reported)


   Entered as Reported by: DONA HARRISON on 8/10/19 1025


Furosemide (Furosemide) 40 Mg Tablet, 40 MG PO DAILY, (Reported)


   Entered as Reported by: DONA HARRISON on 8/10/19 1016


Furosemide (Furosemide) 20 Mg Tablet, 40 MG PO DAILY PRN for AIR HUNGER


   Prescribed by: AUSTIN SARAVIA on 8/10/19 1128


Gabapentin (Gabapentin) 100 Mg Capsule, 100 MG PO TID, (Reported)


   Entered as Reported by: DONA HARRISON on 8/10/19 1025


Gentamicin Sulfate (Gentamicin Sulfate) 5 Ml Drops, 5 ML OP, (Reported)


   Entered as Reported by: DONA HARRISON on 8/10/19 1045


Glimepiride (Amaryl) 2 Mg Tab, 2 MG PO DAILY, (Reported)


   Entered as Reported by: DONA HARRISON on 8/10/19 1025


Insulin Aspart (Novolog Pen) 100 Unit/1 Ml Insuln.pen, 8 UNIT SQ, (Reported)


   Entered as Reported by: COREY VAUGHN on 7/13/12 0657


Insulin Determir (Levemir) 1,000 Units/10 Ml Soln, 60 UNITS SQ HS, (Reported)


   Entered as Reported by: DONA HARRISON on 8/10/19 1025


Insulin Determir (Levemir) 1,000 Units/10 Ml Soln, 50 UNITS SQ DAILY, (Reported)


   Entered as Reported by: DONA HARRISON on 8/10/19 1026


Magnesium Hydroxide (Milk of Magnesia) 400 Mg/5 Ml Oral.susp, 2,400 MG PO DAILY,

(Reported)


   Entered as Reported by: DONA HARRISON on 8/10/19 1045


Ondansetron (Ondansetron Odt) 4 Mg Tab.rapdis, 4 MG PO Q6H PRN for 

NAUSEA/VOMITING


   Prescribed by: ADIS PEGUERO on 10/30/20 1646


Oxymetazoline HCl (Afrin) 15 Ml Mist, 15 ML NS Q15M PRN for nose bleed


   Prescribed by: JACKIE ARAGON on 2/11/22 0521


Pantoprazole Sodium (Protonix) 20 Mg Tablet.dr, 20 MG PO DAILY, (Reported)


   Entered as Reported by: DONA HARRISON on 8/10/19 1045


Paroxetine HCl (Paroxetine HCl) 30 Mg Tablet, 30 MG PO DAILY, (Reported)


   Entered as Reported by: DONA HARRISON on 8/10/19 1016


Potassium Chloride (Klor-Con 10) 10 Meq Tablet.er, 10 MEQ PO DAILY, (Reported)


   Entered as Reported by: DONA HARRISON on 8/10/19 1025





Review of Systems


Review of Systems


Constitutional:  No chills, No fever


Eyes:  Denies Blurred Vision


Ears:  Denies Dizziness


Nose:  epistaxis


Mouth:  no symptoms reported


Throat:  no symptoms reported


Respiratory:  no symptoms reported


Cardiovascular:  no symptoms reported


Gastrointestinal:  no symptoms reported


Musculoskeletal:  no symptoms reported


Skin:  no symptoms reported


Neurological:  No Symptoms Reported


Hematologic/Lymphatic:  No Symptoms Reported


Immunological/Allergic:  no symptoms reported





All Other Systems Reviewed


Negative Unless Noted:  Yes





Past Medical-Social-Family Hx


Patient Social History


Tobacco Use?:  No


Substance use?:  No


Alcohol Use?:  No


Pt feels they are or have been:  No





Seasonal Allergies


Seasonal Allergies:  No





Past Medical History


Surgeries:  Yes (JOINT REPLACEMENT BOTH KNEES, BACK SURGERY)


Gallbladder, Joint Replacement, Orthopedic


Respiratory:  Yes (chronic SOA, wear O2 to keep > 92%)


Cardiac:  Yes (chronic ischemic heart dz, )


High Cholesterol, Hypertension


Neurological:  Yes


Stroke


Reproductive Disorders:  No


Genitourinary:  Yes


UTI-Chronic


Gastrointestinal:  Yes (Hx of dysphagia/aphasia post CVA)


Gastroesophageal Reflux


Musculoskeletal:  Yes (hx back surgery and knee replacements)


Arthritis


Endocrine:  Yes (DM Type II)


Diabetes, Insulin dep


HEENT:  No


Cancer:  No


Psychosocial:  Yes


Anxiety, Depression


Blood Disorders:  No





Family Medical History





Patient reports no known family medical history.





Physical Exam


Vital Signs





Vital Signs - First Documented








 2/11/22





 04:53


 


Temp 37.0


 


Pulse 112


 


Resp 18


 


B/P (MAP) 130/50 (76)


 


Pulse Ox 95


 


O2 Delivery Room Air








Height, Weight, BMI


Height: 5'1.00"


Weight: 225lbs. 0oz. 102.747801zk; 29.00 BMI


Method:Estimated


General Appearance:  WD/WN, no apparent distress


Eyes:  bilateral eye normal inspection


Ears:  bilateral ear auricle normal


Nose:  active bleeding (very slow drip)


Mouth/Throat:  normal mouth inspection, pharynx normal


Neck:  non-tender, full range of motion, supple, normal inspection


Cardiovascular:  regular rate, rhythm, no edema, no murmur


Respiratory:  chest non-tender, lungs clear, normal breath sounds, no 

respiratory distress, no accessory muscle use


Gastrointestinal:  normal bowel sounds, non tender, soft; No distended, No 

guarding, No rebound


Neurologic/Psychiatric:  no motor/sensory deficits, alert, normal mood/affect


Skin:  normal color, warm/dry





Progress/Results/Core Measures


Results/Orders


Lab Results





Laboratory Tests








Test


 2/11/22


05:05 Range/Units


 


 


White Blood Count


 9.7 


 4.3-11.0


10^3/uL


 


Red Blood Count


 3.21 L


 3.80-5.11


10^6/uL


 


Hemoglobin 9.6 L 11.5-16.0  g/dL


 


Hematocrit 30 L 35-52  %


 


Mean Corpuscular Volume 94  80-99  fL


 


Mean Corpuscular Hemoglobin 30  25-34  pg


 


Mean Corpuscular Hemoglobin


Concent 32 


 32-36  g/dL





 


Red Cell Distribution Width 14.3  10.0-14.5  %


 


Platelet Count


 396 


 130-400


10^3/uL


 


Mean Platelet Volume 8.8 L 9.0-12.2  fL


 


Immature Granulocyte % (Auto) 0   %


 


Neutrophils (%) (Auto) 69  42-75  %


 


Lymphocytes (%) (Auto) 23  12-44  %


 


Monocytes (%) (Auto) 6  0-12  %


 


Eosinophils (%) (Auto) 3  0-10  %


 


Basophils (%) (Auto) 0  0-10  %


 


Neutrophils # (Auto)


 6.7 


 1.8-7.8


10^3/uL


 


Lymphocytes # (Auto)


 2.2 


 1.0-4.0


10^3/uL


 


Monocytes # (Auto)


 0.6 


 0.0-1.0


10^3/uL


 


Eosinophils # (Auto)


 0.2 


 0.0-0.3


10^3/uL


 


Basophils # (Auto)


 0.0 


 0.0-0.1


10^3/uL


 


Immature Granulocyte # (Auto)


 0.0 


 0.0-0.1


10^3/uL








My Orders





Orders - JACKIE ARAGON MD


Tranexamic Acid Injection (Cyklokapron I (2/11/22 05:00)


Cbc With Automated Diff (2/11/22 04:57)


Oxymetazoline 0.05% Nasal Spry (Afrin 0. (2/11/22 04:59)





Medications Given in ED





Current Medications








 Medications  Dose


 Ordered  Sig/Johnson


 Route  Start Time


 Stop Time Status Last Admin


Dose Admin


 


 Tranexamic Acid    ONCE  ONCE


 NA  2/11/22 05:00


 2/11/22 05:01 DC 2/11/22 05:13


1 MG








Vital Signs/I&O











 2/11/22





 04:53


 


Temp 37.0


 


Pulse 112


 


Resp 18


 


B/P (MAP) 130/50 (76)


 


Pulse Ox 95


 


O2 Delivery Room Air














Blood Pressure Mean:                    76











Progress


Progress Note :  


Progress Note


76-year-old female with above history coming in due to epistaxis.  ABCs were 

intact, vital stable on presentation although she is mildly tachycardic over 

100.  Given the amount of bleeding all day, CBC ordered.  Her hemoglobin is 9.6 

which is a significant drop from prior years ago.  Could be that this slowly 

occurred over years versus this is more acute.  Suspect is more acute given her 

tachycardia.  She is not low enough to require transfusion and the bleeding has 

essentially stopped.  Had her blow her nose, gave Afrin spray to bilateral nares

although is more coming from the left, and held pressure for 10 minutes.  Then 

followed up with TXA sprayed up bilateral naris with more pressure to help 

hopefully stabilize the clot.  On reassessment had no bleeding.  I believe she 

is stable for discharge with outpatient follow-up.  She was sent to the nursing 

home with strict return precautions.





Departure


Impression





   Primary Impression:  


   Epistaxis


Disposition:  01 HOME, SELF-CARE


Condition:  Stable





Departure-Patient Inst.


Decision time for Depature:  05:35


Referrals:  


AUSTIN WELCH MD (PCP/Family)


Primary Care Physician


Patient Instructions:  Nosebleeds ED





Add. Discharge Instructions:  


I recommend using the Afrin 2 sprays each nostril as needed for nosebleeds (sent

to [facility] in [place]).  You can do this once, apply pressure, and then do it 

again 15 minutes later.  Hold pressure again for 15 minutes at that point. If 

the nosebleed has not stopped at that point please call your doctor.


Scripts


Oxymetazoline HCl (Afrin) 15 Ml Mist


15 ML NS Q15M PRN for nose bleed for 14 Days, #1 EA


   2 sprays each nostril with nose bleed, hold pressure for 15 minutes,


   then can repeat 2 sprays each nostril. Do not repeat more than twice


   in a day.


   Prov: JACKIE ARAGON MD         2/11/22











JACKIE ARAGON MD          Feb 11, 2022 05:19

## 2022-06-23 ENCOUNTER — HOSPITAL ENCOUNTER (INPATIENT)
Dept: HOSPITAL 75 - ER FS | Age: 77
LOS: 6 days | Discharge: NURSING, REHAB OR LONG-TERM CARE | DRG: 871 | End: 2022-06-29
Attending: INTERNAL MEDICINE | Admitting: INTERNAL MEDICINE
Payer: MEDICARE

## 2022-06-23 VITALS — DIASTOLIC BLOOD PRESSURE: 42 MMHG | SYSTOLIC BLOOD PRESSURE: 83 MMHG

## 2022-06-23 VITALS — DIASTOLIC BLOOD PRESSURE: 41 MMHG | SYSTOLIC BLOOD PRESSURE: 85 MMHG

## 2022-06-23 VITALS — SYSTOLIC BLOOD PRESSURE: 100 MMHG | DIASTOLIC BLOOD PRESSURE: 51 MMHG

## 2022-06-23 VITALS — HEIGHT: 61.02 IN | BODY MASS INDEX: 33.34 KG/M2 | WEIGHT: 176.59 LBS

## 2022-06-23 VITALS — SYSTOLIC BLOOD PRESSURE: 118 MMHG | DIASTOLIC BLOOD PRESSURE: 59 MMHG

## 2022-06-23 VITALS — DIASTOLIC BLOOD PRESSURE: 101 MMHG | SYSTOLIC BLOOD PRESSURE: 129 MMHG

## 2022-06-23 VITALS — DIASTOLIC BLOOD PRESSURE: 46 MMHG | SYSTOLIC BLOOD PRESSURE: 62 MMHG

## 2022-06-23 VITALS — SYSTOLIC BLOOD PRESSURE: 122 MMHG | DIASTOLIC BLOOD PRESSURE: 70 MMHG

## 2022-06-23 VITALS — SYSTOLIC BLOOD PRESSURE: 81 MMHG | DIASTOLIC BLOOD PRESSURE: 51 MMHG

## 2022-06-23 VITALS — DIASTOLIC BLOOD PRESSURE: 78 MMHG | SYSTOLIC BLOOD PRESSURE: 126 MMHG

## 2022-06-23 VITALS — DIASTOLIC BLOOD PRESSURE: 66 MMHG | SYSTOLIC BLOOD PRESSURE: 114 MMHG

## 2022-06-23 VITALS — DIASTOLIC BLOOD PRESSURE: 40 MMHG | SYSTOLIC BLOOD PRESSURE: 87 MMHG

## 2022-06-23 VITALS — DIASTOLIC BLOOD PRESSURE: 58 MMHG | SYSTOLIC BLOOD PRESSURE: 104 MMHG

## 2022-06-23 DIAGNOSIS — I50.33: ICD-10-CM

## 2022-06-23 DIAGNOSIS — I48.0: ICD-10-CM

## 2022-06-23 DIAGNOSIS — L89.610: ICD-10-CM

## 2022-06-23 DIAGNOSIS — N39.0: ICD-10-CM

## 2022-06-23 DIAGNOSIS — Z79.4: ICD-10-CM

## 2022-06-23 DIAGNOSIS — F32.A: ICD-10-CM

## 2022-06-23 DIAGNOSIS — E66.9: ICD-10-CM

## 2022-06-23 DIAGNOSIS — A41.9: Primary | ICD-10-CM

## 2022-06-23 DIAGNOSIS — J96.21: ICD-10-CM

## 2022-06-23 DIAGNOSIS — I11.0: ICD-10-CM

## 2022-06-23 DIAGNOSIS — E78.00: ICD-10-CM

## 2022-06-23 DIAGNOSIS — Z20.822: ICD-10-CM

## 2022-06-23 DIAGNOSIS — L89.899: ICD-10-CM

## 2022-06-23 DIAGNOSIS — F03.90: ICD-10-CM

## 2022-06-23 DIAGNOSIS — H54.7: ICD-10-CM

## 2022-06-23 DIAGNOSIS — L89.159: ICD-10-CM

## 2022-06-23 DIAGNOSIS — K21.9: ICD-10-CM

## 2022-06-23 DIAGNOSIS — F41.9: ICD-10-CM

## 2022-06-23 DIAGNOSIS — Z79.84: ICD-10-CM

## 2022-06-23 DIAGNOSIS — L89.620: ICD-10-CM

## 2022-06-23 DIAGNOSIS — E11.9: ICD-10-CM

## 2022-06-23 DIAGNOSIS — R65.21: ICD-10-CM

## 2022-06-23 DIAGNOSIS — Z79.02: ICD-10-CM

## 2022-06-23 DIAGNOSIS — E87.2: ICD-10-CM

## 2022-06-23 DIAGNOSIS — I25.10: ICD-10-CM

## 2022-06-23 DIAGNOSIS — Z79.82: ICD-10-CM

## 2022-06-23 DIAGNOSIS — Z66: ICD-10-CM

## 2022-06-23 DIAGNOSIS — Z88.8: ICD-10-CM

## 2022-06-23 DIAGNOSIS — M19.91: ICD-10-CM

## 2022-06-23 LAB
ALBUMIN SERPL-MCNC: 3.4 GM/DL (ref 3.2–4.5)
ALP SERPL-CCNC: 159 U/L (ref 40–136)
ALT SERPL-CCNC: 15 U/L (ref 0–55)
APTT BLD: 29 SEC (ref 24–35)
APTT PPP: YELLOW S
ARTERIAL PATENCY WRIST A: (no result)
BACTERIA #/AREA URNS HPF: NEGATIVE /HPF
BASE EXCESS STD BLDA CALC-SCNC: 2.8 MMOL/L (ref -2.5–2.5)
BASOPHILS # BLD AUTO: 0 10^3/UL (ref 0–0.1)
BASOPHILS NFR BLD AUTO: 0 % (ref 0–10)
BDY SITE: (no result)
BILIRUB SERPL-MCNC: 0.5 MG/DL (ref 0.1–1)
BILIRUB UR QL STRIP: NEGATIVE
BODY TEMPERATURE: (no result)
BUN/CREAT SERPL: 29
CALCIUM SERPL-MCNC: 9.4 MG/DL (ref 8.5–10.1)
CHLORIDE SERPL-SCNC: 99 MMOL/L (ref 98–107)
CO2 BLDA CALC-SCNC: 31.4 MMOL/L (ref 21–31)
CO2 SERPL-SCNC: 25 MMOL/L (ref 21–32)
CREAT SERPL-MCNC: 0.87 MG/DL (ref 0.6–1.3)
D DIMER PPP FEU-MCNC: 1.57 UG/ML (ref 0–0.49)
EOSINOPHIL # BLD AUTO: 0.1 10^3/UL (ref 0–0.3)
EOSINOPHIL NFR BLD AUTO: 1 % (ref 0–10)
FIBRINOGEN PPP-MCNC: (no result) MG/DL
GFR SERPLBLD BASED ON 1.73 SQ M-ARVRAT: 69 ML/MIN
GLUCOSE SERPL-MCNC: 310 MG/DL (ref 70–105)
GLUCOSE UR STRIP-MCNC: NEGATIVE MG/DL
HCT VFR BLD CALC: 32 % (ref 35–52)
HGB BLD-MCNC: 9.4 G/DL (ref 11.5–16)
HYALINE CASTS #/AREA URNS LPF: (no result) /LPF
INHALED O2 FLOW RATE: (no result) L/MIN
INR PPP: 0.9 (ref 0.8–1.4)
KETONES UR QL STRIP: NEGATIVE
LEUKOCYTE ESTERASE UR QL STRIP: (no result)
LYMPHOCYTES # BLD AUTO: 1.1 10^3/UL (ref 1–4)
LYMPHOCYTES NFR BLD AUTO: 10 % (ref 12–44)
MANUAL DIFFERENTIAL PERFORMED BLD QL: NO
MCH RBC QN AUTO: 27 PG (ref 25–34)
MCHC RBC AUTO-ENTMCNC: 29 G/DL (ref 32–36)
MCV RBC AUTO: 91 FL (ref 80–99)
MONOCYTES # BLD AUTO: 0.4 10^3/UL (ref 0–1)
MONOCYTES NFR BLD AUTO: 4 % (ref 0–12)
NEUTROPHILS # BLD AUTO: 9.8 10^3/UL (ref 1.8–7.8)
NEUTROPHILS NFR BLD AUTO: 85 % (ref 42–75)
NITRITE UR QL STRIP: NEGATIVE
PCO2 BLDA: 55 MMHG (ref 35–45)
PH BLDA: 7.34 [PH] (ref 7.37–7.43)
PH UR STRIP: 5.5 [PH] (ref 5–9)
PLATELET # BLD: 411 10^3/UL (ref 130–400)
PMV BLD AUTO: 8.6 FL (ref 9–12.2)
PO2 BLDA: 79 MMHG (ref 79–93)
POTASSIUM SERPL-SCNC: 5.3 MMOL/L (ref 3.6–5)
PROT SERPL-MCNC: 6.9 GM/DL (ref 6.4–8.2)
PROT UR QL STRIP: NEGATIVE
PROTHROMBIN TIME: 12.9 SEC (ref 12.2–14.7)
RBC #/AREA URNS HPF: (no result) /HPF
SAO2 % BLDA FROM PO2: 95 % (ref 94–100)
SODIUM SERPL-SCNC: 139 MMOL/L (ref 135–145)
SP GR UR STRIP: 1.02 (ref 1.02–1.02)
VENTILATION MODE VENT: NO
WBC # BLD AUTO: 11.5 10^3/UL (ref 4.3–11)

## 2022-06-23 PROCEDURE — 85610 PROTHROMBIN TIME: CPT

## 2022-06-23 PROCEDURE — 85379 FIBRIN DEGRADATION QUANT: CPT

## 2022-06-23 PROCEDURE — 82805 BLOOD GASES W/O2 SATURATION: CPT

## 2022-06-23 PROCEDURE — 87040 BLOOD CULTURE FOR BACTERIA: CPT

## 2022-06-23 PROCEDURE — 96376 TX/PRO/DX INJ SAME DRUG ADON: CPT

## 2022-06-23 PROCEDURE — 85730 THROMBOPLASTIN TIME PARTIAL: CPT

## 2022-06-23 PROCEDURE — 80048 BASIC METABOLIC PNL TOTAL CA: CPT

## 2022-06-23 PROCEDURE — 81000 URINALYSIS NONAUTO W/SCOPE: CPT

## 2022-06-23 PROCEDURE — 96372 THER/PROPH/DIAG INJ SC/IM: CPT

## 2022-06-23 PROCEDURE — 93005 ELECTROCARDIOGRAM TRACING: CPT

## 2022-06-23 PROCEDURE — 83735 ASSAY OF MAGNESIUM: CPT

## 2022-06-23 PROCEDURE — 85025 COMPLETE CBC W/AUTO DIFF WBC: CPT

## 2022-06-23 PROCEDURE — 84145 PROCALCITONIN (PCT): CPT

## 2022-06-23 PROCEDURE — 36600 WITHDRAWAL OF ARTERIAL BLOOD: CPT

## 2022-06-23 PROCEDURE — 83605 ASSAY OF LACTIC ACID: CPT

## 2022-06-23 PROCEDURE — 93306 TTE W/DOPPLER COMPLETE: CPT

## 2022-06-23 PROCEDURE — 82947 ASSAY GLUCOSE BLOOD QUANT: CPT

## 2022-06-23 PROCEDURE — 87636 SARSCOV2 & INF A&B AMP PRB: CPT

## 2022-06-23 PROCEDURE — 96374 THER/PROPH/DIAG INJ IV PUSH: CPT

## 2022-06-23 PROCEDURE — 71045 X-RAY EXAM CHEST 1 VIEW: CPT

## 2022-06-23 PROCEDURE — 87088 URINE BACTERIA CULTURE: CPT

## 2022-06-23 PROCEDURE — 36415 COLL VENOUS BLD VENIPUNCTURE: CPT

## 2022-06-23 PROCEDURE — 96375 TX/PRO/DX INJ NEW DRUG ADDON: CPT

## 2022-06-23 PROCEDURE — 83880 ASSAY OF NATRIURETIC PEPTIDE: CPT

## 2022-06-23 PROCEDURE — 80053 COMPREHEN METABOLIC PANEL: CPT

## 2022-06-23 PROCEDURE — 86141 C-REACTIVE PROTEIN HS: CPT

## 2022-06-23 RX ADMIN — DOPAMINE HYDROCHLORIDE IN DEXTROSE SCH MLS/HR: 1.6 INJECTION, SOLUTION INTRAVENOUS at 18:45

## 2022-06-23 RX ADMIN — DOPAMINE HYDROCHLORIDE IN DEXTROSE SCH MLS/HR: 1.6 INJECTION, SOLUTION INTRAVENOUS at 20:14

## 2022-06-23 RX ADMIN — ENOXAPARIN SODIUM SCH MG: 100 INJECTION SUBCUTANEOUS at 17:19

## 2022-06-23 RX ADMIN — SODIUM CHLORIDE SCH MLS/HR: 900 INJECTION, SOLUTION INTRAVENOUS at 18:52

## 2022-06-23 RX ADMIN — Medication SCH MLS/HR: at 16:15

## 2022-06-23 NOTE — ED RESPIRATORY
General


Chief Complaint:  Respiratory Problems


Stated Complaint:  ELEV HR


Source:  patient, family, EMS


Exam Limitations:  clinical condition (dementia)





History of Present Illness


Date Seen by Provider:  2022


Time Seen by Provider:  11:47


Initial Comments


Patient to the ER by EMS from [facility] with chief complaint that today

she is having some increased working of breathing, shortness of air and atrial 

fibrillation with a rate of 1 20-1 40 on the EKG.  She has a known history of 

atrial fibrillation on Plavix but not on a blood thinner.  She is on Lasix 40 mg

daily and does not claim to have any more edema than normal.  She is not having 

any cough or fevers or chills.  She is never a smoker.  She denies a history of 

COPD or asthma.  No wheezing heard.  Oxygen saturations in the mid to upper 80s 

on arrival and EMS said they got her sats into the mid 90s with a nasal cannula 

set at 4 L/min.  No known sick contacts.  She is unsure if she has had COVID-19 

vaccination.  Patient of Dr. Welch.  She is not followed routinely with a 

cardiologist.  The patient has dementia and part of the history is obtained by 

her son.


She is not experiencing any chest pain but she does have a history of coronary 

disease, diabetes, hypertension, hyperlipidemia.


Echocardiogram 2020 by Dr. Dominguez with an EF of 40 to 45% and grade 1 diastolic 

dysfunction.  Cardiac catheterization by Dr. Gonsalez  with 90% in-stent 

restenosis of the LAD successfully ballooned and 50% proximal left anterior 

descending stenosis and 50% proximal right coronary artery stenosis with distal 

right coronary artery stenosis.  EF of 60% at that time.


She is in wound care at [facility] but missed the last week.





Allergies and Home Medications


Allergies


Coded Allergies:  


     diazepam (Unverified  Adverse Reaction, Unknown, 8/10/19)





Patient Home Medication List


Home Medication List Reviewed:  Yes


Acetaminophen (Tylenol) 500 Mg Tablet, 500-1,000 MG PO Q6HRS MILD PAIN PRN, 

(Reported)


   Entered as Reported by: COREY VAUGHN on 12 0657


Amlodipine Besylate (Amlodipine Besylate) 5 Mg Tablet, 5 MG PO DAILY


   Prescribed by: MARIBEL CASTILLO on 20 1602


Aspirin (Aspirin Ec 81 Mg) 81 Mg Tabec, 81 MG PO DAILY, (Reported)


   Entered as Reported by: COREY VAUGHN on 12 0657


Atorvastatin Calcium (Lipitor) 40 Mg Tablet, 40 MG PO DAILY, (Reported)


   Entered as Reported by: DONA HARRISON on 8/10/19 1026


Benazepril Hcl (Lotensin 20 Mg) 20 Mg Tablet, 20 MG PO BID, (Reported)


   Entered as Reported by: COREY VAUGHN on 12 0657


Cephalexin (Cephalexin) 500 Mg Tablet, 500 MG PO QID


   Prescribed by: ADIS PEGUERO on 10/30/20 1646


Clopidogrel Bisulfate (Clopidogrel) 75 Mg Tablet, 75 MG PO DAILY, (Reported)


   Entered as Reported by: DONA HARRISON on 8/10/19 1025


Docusate Sodium (Colace) 100 Mg Capsule, 100 MG PO BID, (Reported)


   Entered as Reported by: DONA HARRISON on 8/10/19 1025


Furosemide (Furosemide) 40 Mg Tablet, 40 MG PO DAILY, (Reported)


   Entered as Reported by: DONA HARRISON on 8/10/19 1016


Furosemide (Furosemide) 20 Mg Tablet, 40 MG PO DAILY PRN for AIR HUNGER


   Prescribed by: AUSTIN SARAVIA on 8/10/19 1128


Gabapentin (Gabapentin) 100 Mg Capsule, 100 MG PO TID, (Reported)


   Entered as Reported by: DONA HARRISON on 8/10/19 1025


Gentamicin Sulfate (Gentamicin Sulfate) 5 Ml Drops, 5 ML OP, (Reported)


   Entered as Reported by: DONA HARRISON on 8/10/19 1045


Glimepiride (Amaryl) 2 Mg Tab, 2 MG PO DAILY, (Reported)


   Entered as Reported by: DONA HARRISON on 8/10/19 1025


Insulin Aspart (Novolog Pen) 100 Unit/1 Ml Insuln.pen, 8 UNIT SQ, (Reported)


   Entered as Reported by: COREY VAUGHN on 12 0657


Insulin Determir (Levemir) 1,000 Units/10 Ml Soln, 60 UNITS SQ HS, (Reported)


   Entered as Reported by: DONA HARRISON on 8/10/19 1025


Insulin Determir (Levemir) 1,000 Units/10 Ml Soln, 50 UNITS SQ DAILY, (Reported)


   Entered as Reported by: DONA HARRISON on 8/10/19 1026


Magnesium Hydroxide (Milk of Magnesia) 400 Mg/5 Ml Oral.susp, 2,400 MG PO DAILY,

(Reported)


   Entered as Reported by: DONA HARRISON on 8/10/19 1045


Ondansetron (Ondansetron Odt) 4 Mg Tab.rapdis, 4 MG PO Q6H PRN for 

NAUSEA/VOMITING


   Prescribed by: ADIS PEGUERO on 10/30/20 1646


Oxymetazoline HCl (Afrin) 15 Ml Mist, 15 ML NS Q15M PRN for nose bleed


   Prescribed by: JACKIE ARAGON on 22 0521


Pantoprazole Sodium (Protonix) 20 Mg Tablet.dr, 20 MG PO DAILY, (Reported)


   Entered as Reported by: DONA HARRISON on 8/10/19 1045


Paroxetine HCl (Paroxetine HCl) 30 Mg Tablet, 30 MG PO DAILY, (Reported)


   Entered as Reported by: DONA HARRISON on 8/10/19 1016


Potassium Chloride (Klor-Con 10) 10 Meq Tablet.er, 10 MEQ PO DAILY, (Reported)


   Entered as Reported by: DONA HARRISON on 8/10/19 1025





Review of Systems


Review of Systems


Constitutional:  No chills, No fever, No malaise


EENTM:  No ear discharge, No ear pain


Respiratory:  No cough, No phlegm; short of breath; No wheezing


Cardiovascular:  see HPI; No chest pain, No edema; Hx of Intervention; No 

palpitations


Gastrointestinal:  No abdominal pain, No constipation, No diarrhea, No nausea


Genitourinary:  No discharge, No dysuria


Musculoskeletal:  No back pain, No joint pain





All Other Systems Reviewed


Negative Unless Noted:  Yes





Past Medical-Social-Family Hx


Patient Social History


Tobacco Use?:  No


Use of E-Cig and/or Vaping dev:  No


Substance use?:  No





Seasonal Allergies


Seasonal Allergies:  No





Past Medical History


Surgeries:  Yes (JOINT REPLACEMENT BOTH KNEES, BACK SURGERY)


Gallbladder, Joint Replacement, Orthopedic


Respiratory:  Yes (chronic SOA, wear O2 to keep > 92%)


Cardiac:  Yes (chronic ischemic heart dz, )


High Cholesterol, Hypertension


Neurological:  Yes


Stroke


Reproductive Disorders:  No


Genitourinary:  Yes


UTI-Chronic


Gastrointestinal:  Yes (Hx of dysphagia/aphasia post CVA)


Gastroesophageal Reflux


Musculoskeletal:  Yes (hx back surgery and knee replacements)


Arthritis


Endocrine:  Yes (DM Type II)


Diabetes, Insulin dep


HEENT:  No


Cancer:  No


Psychosocial:  Yes


Anxiety, Depression


Blood Disorders:  No





Family Medical History





Patient reports no known family medical history.





Physical Exam





Vital Signs - First Documented








 22





 11:44


 


Temp 36.3


 


Pulse 124


 


Resp 21


 


B/P (MAP) 123/74 (90)


 


Pulse Ox 96


 


O2 Delivery Nasal Cannula


 


O2 Flow Rate 2.00





Capillary Refill :


Height: 5'1.00"


Weight: 225lbs. 0oz. 102.339361cs; 29.00 BMI


Method:Estimated


General Appearance:  mild distress, obese


Eyes:  Bilateral Eye Normal Inspection, Bilateral Eye PERRL, Bilateral Eye EOMI


HEENT:  PERRL/EOMI; No pharynx normal (Mildly dry)


Neck:  non-tender, full range of motion, supple, normal inspection


Respiratory:  no accessory muscle use, respiratory distress (Mild with oxygen 

saturations 96% on 4 L by nasal cannula), crackles; No wheezing


Cardiovascular:  normal peripheral pulses, no JVD, tachycardia (130+-10), 

irregularly irregular, other (Trace bipedal edema)


Gastrointestinal:  normal bowel sounds, non tender, soft


Extremities:  normal range of motion, no calf tenderness, normal capillary 

refill, pedal edema (Trace bipedal)


Neurologic/Psychiatric:  CNs II-XII nml as tested, no motor/sensory deficits, 

alert, normal mood/affect; No oriented x 3 (Oriented to person and place but not

time or situation)


Skin:  warm/dry, pallor, other (She has some 3 cm diameter heel breakdown 

unstageable ulcers that needs to be debrided bilateral heels and on the right 

lateral distal foot there is a black eschar unstageable pressure ulcer 

approximately 2-1/2 cm diameter.)





Focused Exam


Sepsis Stage:  Ruled Out


Reason for ruling out sepsis:  No source for infection or white count


Lactate Level


22 11:45: Lactic Acid Level 5.20*H


22 13:43: 





Lactic Acid Level





Laboratory Tests








Test


 22


11:45 22


13:43


 


Lactic Acid Level


 5.20 MMOL/L


(0.50-2.00)  *H 














Progress/Results/Core Measures


Suspected Sepsis


SIRS


Temperature: 


Pulse:  


Respiratory Rate: 


 


Laboratory Tests


22 11:45: White Blood Count 11.5H


Blood Pressure  / 


Mean: 


 





22 11:45: Lactic Acid Level 5.20*H


22 13:43: 


Laboratory Tests


22 11:45: 


Creatinine 0.87, INR Comment 0.9, Platelet Count 411H, Total Bilirubin 0.5








Results/Orders


Lab Results





Laboratory Tests








Test


 22


11:45 22


12:30 22


12:45 22


13:43 Range/Units


 


 


White Blood Count


 11.5 H


 


 


 


 4.3-11.0


10^3/uL


 


Red Blood Count


 3.53 L


 


 


 


 3.80-5.11


10^6/uL


 


Hemoglobin 9.4 L    11.5-16.0  g/dL


 


Hematocrit 32 L    35-52  %


 


Mean Corpuscular Volume 91     80-99  fL


 


Mean Corpuscular Hemoglobin 27     25-34  pg


 


Mean Corpuscular Hemoglobin


Concent 29 L


 


 


 


 32-36  g/dL





 


Red Cell Distribution Width 16.6 H    10.0-14.5  %


 


Platelet Count


 411 H


 


 


 


 130-400


10^3/uL


 


Mean Platelet Volume 8.6 L    9.0-12.2  fL


 


Immature Granulocyte % (Auto) 0      %


 


Neutrophils (%) (Auto) 85 H    42-75  %


 


Lymphocytes (%) (Auto) 10 L    12-44  %


 


Monocytes (%) (Auto) 4     0-12  %


 


Eosinophils (%) (Auto) 1     0-10  %


 


Basophils (%) (Auto) 0     0-10  %


 


Neutrophils # (Auto)


 9.8 H


 


 


 


 1.8-7.8


10^3/uL


 


Lymphocytes # (Auto)


 1.1 


 


 


 


 1.0-4.0


10^3/uL


 


Monocytes # (Auto)


 0.4 


 


 


 


 0.0-1.0


10^3/uL


 


Eosinophils # (Auto)


 0.1 


 


 


 


 0.0-0.3


10^3/uL


 


Basophils # (Auto)


 0.0 


 


 


 


 0.0-0.1


10^3/uL


 


Immature Granulocyte # (Auto)


 0.1 


 


 


 


 0.0-0.1


10^3/uL


 


Prothrombin Time 12.9     12.2-14.7  SEC


 


INR Comment 0.9     0.8-1.4  


 


Activated Partial


Thromboplast Time 29 


 


 


 


 24-35  SEC





 


D-Dimer


 1.57 H


 


 


 


 0.00-0.49


UG/ML


 


Sodium Level 139     135-145  MMOL/L


 


Potassium Level 5.3 H    3.6-5.0  MMOL/L


 


Chloride Level 99       MMOL/L


 


Carbon Dioxide Level 25     21-32  MMOL/L


 


Anion Gap 15 H    5-14  MMOL/L


 


Blood Urea Nitrogen 25 H    7-18  MG/DL


 


Creatinine


 0.87 


 


 


 


 0.60-1.30


MG/DL


 


Estimat Glomerular Filtration


Rate 69 


 


 


 


  





 


BUN/Creatinine Ratio 29      


 


Glucose Level 310 H      MG/DL


 


Lactic Acid Level


 5.20 *H


 


 


 


 0.50-2.00


MMOL/L


 


Calcium Level 9.4     8.5-10.1  MG/DL


 


Corrected Calcium 9.9     8.5-10.1  MG/DL


 


Total Bilirubin 0.5     0.1-1.0  MG/DL


 


Aspartate Amino Transf


(AST/SGOT) 30 


 


 


 


 5-34  U/L





 


Alanine Aminotransferase


(ALT/SGPT) 15 


 


 


 


 0-55  U/L





 


Alkaline Phosphatase 159 H      U/L


 


C-Reactive Protein 0.64 H    <0.50  MG/DL


 


Pro-B-Type Natriuretic Peptide 810.3 H    <75.0  PG/ML


 


Total Protein 6.9     6.4-8.2  GM/DL


 


Albumin 3.4     3.2-4.5  GM/DL


 


Blood Gas Puncture Site  RT RADIAL     


 


Blood Gas Patient Temperature  37C     


 


Arterial Blood pH  7.34 *L   7.37-7.43  


 


Arterial Blood Partial


Pressure CO2 


 55 H


 


 


 35-45  MMHG





 


Arterial Blood Partial


Pressure O2 


 79 


 


 


 79-93  MMHG





 


Arterial Blood HCO3  30 H   23-27  MMOL/L


 


Arterial Blood Total CO2


 


 31.4 H


 


 


 21.0-31.0


MMOL/L


 


Arterial Blood Oxygen


Saturation 


 95 


 


 


   %





 


Arterial Blood Base Excess


 


 2.8 H


 


 


 -2.5-2.5


MMOL/L


 


Cornel Test  OK     


 


Blood Gas Ventilator Setting  NO     


 


Blood Gas Inspired Oxygen  4 LITERS     


 


Urine Color   YELLOW    


 


Urine Clarity   SL CLOUDY    


 


Urine pH   5.5   5-9  


 


Urine Specific Gravity   1.020   1.016-1.022  


 


Urine Protein   NEGATIVE   NEGATIVE  


 


Urine Glucose (UA)   NEGATIVE   NEGATIVE  


 


Urine Ketones   NEGATIVE   NEGATIVE  


 


Urine Nitrite   NEGATIVE   NEGATIVE  


 


Urine Bilirubin   NEGATIVE   NEGATIVE  


 


Urine Urobilinogen   0.2   < = 1.0  MG/DL


 


Urine Leukocyte Esterase   1+ H  NEGATIVE  


 


Urine RBC (Auto)   NEGATIVE   NEGATIVE  


 


Urine RBC   RARE    /HPF


 


Urine WBC   10-25 H   /HPF


 


Urine Squamous Epithelial


Cells 


 


 10-25 H


 


  /HPF





 


Urine Crystals   NONE    /LPF


 


Urine Bacteria   NEGATIVE    /HPF


 


Urine Casts   PRESENT    /LPF


 


Urine Hyaline Casts   2-5 H   /LPF


 


Urine Mucus   SMALL H   /LPF


 


Urine Culture Indicated   NO    








My Orders





Orders - CARLOTA MORELAND


Cbc With Automated Diff (22 12:03)


Comprehensive Metabolic Panel (22 12:03)


Blood Culture (22 12:03)


Sputum Culture (22 12:03)


Urinalysis (22 12:03)


Urine Culture (22 12:03)


Protime With Inr (22 12:03)


Partial Thromboplastin Time (22 12:03)


Chest 1 View Ap/Pa Only (22 12:03)


Ed Iv/Invasive Line Start (22 12:03)


Ed Iv/Invasive Line Start (22 12:03)


Ekg Tracing (22 12:03)


Vital Signs Adult Sepsis Patie Q15M (22 12:03)


O2 (22 12:03)


Remove Rings In Anticipation O (22 12:03)


Lactic Acid Analyzer (22 12:03)


Cefepime Injection (Maxipime Injection) (22 12:15)


Crp Fs (22 12:03)


Covid 19 Inhouse Test (22 12:03)


Fibrin Degradation Products (22 12:03)


Procalcitonin (Pct) (22 12:03)


Probnp Fs (22 11:45)


Aspirin Chewable Tablet (Baby Aspirin Ch (22 12:15)


Diltiazem Injection (Cardizem Injection) (22 12:15)


Diltiazem Drip Pre-Mix (Cardizem Drip Pr (22 12:45)


Arterial Blood Gas (22 12:35)


Catheter(Urinary) Insert & Ass 03,15 (22 12:36)


Ed Iv/Invasive Line Start (22 12:57)


Ns Iv 1000 Ml (Sodium Chloride 0.9%) (22 13:00)


Insulin (Regular) Human (Novolin R (Per (22 13:00)


Ekg Tracing (22 13:00)


Ed Iv/Invasive Line Start (22 13:00)


Ns Iv 500 Ml (Sodium Chloride 0.9%) (22 13:00)


Diltiazem Drip Pre-Mix (Cardizem Drip Pr (22 13:36)


Ed Admission (Communication) (22 13:55)





Medications Given in ED





Current Medications








 Medications  Dose


 Ordered  Sig/Johnson


 Route  Start Time


 Stop Time Status Last Admin


Dose Admin


 


 Aspirin  324 mg  ONCE  ONCE


 PO  22 12:15


 22 12:16 DC 22 12:30


324 MG


 


 Cefepime HCl 1000


 mg/Sodium Chloride  50 ml @ 


 100 mls/hr  ONCE  ONCE


 IV  22 12:15


 22 12:44 DC 22 12:30


100 MLS/HR


 


 Diltiazem HCl  10 mg  ONCE  ONCE


 IVP  22 12:15


 22 12:16 DC 22 12:55


10 MG


 


 Diltiazem HCl  125 ml @ ud  STK-MED ONCE


 IV  22 13:36


 22 13:38 DC 22 13:39


5 MLS/HR


 


 Insulin Human


 Regular  5 unit  ONCE  ONCE


 SC  22 13:00


 22 13:01 DC 22 13:10


5 UNIT


 


 Sodium Chloride  500 ml @ 0


 mls/hr  Q0M ONCE


 IV  22 13:00


 22 13:02 DC 22 13:11


500 MLS/HR








Vital Signs/I&O











 22





 11:44 11:44


 


Temp  36.3


 


Pulse  124


 


Resp  21


 


B/P (MAP)  123/74 (90)


 


Pulse Ox  96


 


O2 Delivery Nasal Cannula Nasal Cannula


 


O2 Flow Rate 2.00 4.00





Capillary Refill :


Progress Note #1:  


   Time:  12:08


Progress Note


Patient is stable with A. fib and RVR which could be the source of her symptoms.

 She has Lasix and a history of coronary disease.  She could be an episode of 

heart failure but she is not having JVD or extensive edema.  If her chest x-ray 

does not show a lot of edema or pneumonia than we would work on just slowing her

heart rate down.  If she has evidence of pneumonia we have started on sepsis 

work-up with cefepime but will hold off on IV fluids.  COVID-19 swab. ABG. This 

EKG likely represents poor perfusion due to RVR and a history of coronary 

disease.


Progress Note #2:  


   Time:  13:02


Progress Note


Patient had a significant drop in her rate after the 10 mg push of Cardizem to 

around 80 but it still appears irregular.  We will repeat an EKG.  Holding off 

on a Cardizem drip.  We will give 500 cc fluid bolus as she appears to be dry 

and BNP is not very impressive at 800.


Progress Note #3:  


   Time:  13:25


Progress Note


Urine is clean and she has no complaints of dysuria, fever or elevated white 

count.  I suspect that her urinalysis represents a contamination.





ECG


Initial ECG Impression Date:  2022


Initial ECG Impression Time:  11:48


Initial ECG Rate:  121


Initial ECG Rhythm:  A Fib/Flutter


Initial ECG Intervals:  QT (364)


Initial ECG Impression:  Atrial Fibrillation w/RVR


Initial ECG Comparisson:  Changed


Comment


Atrial fibrillation with rapid ventricular response and approximately 1 blocks 

of ST depression in the anterolateral leads V3, V4, V5 and V6.  There is no 

reciprocal ST elevation seen.


EKG :  


   EKG Time:  13:05


   Rate:  79


   Rhythm:  A Fib/Flutter


   Intervals:  QT (396)


   ECG Comparisson:  Changed


   ECG Impression:  Atrial Fibrillation


Comment


Atrial fibrillation with no rapid ventricular response.  The ST depression in 

the lateral leads seen before is no longer present.





Diagnostic Imaging





   Diagonstic Imaging:  Xray


   Plain Films/CT/US/NM/MRI:  chest


Comments


                 [facility], [facility].


                                [place], [place]





NAME:   THEA MORRISON


[facility] REC#:   [id number]


ACCOUNT#:   [id number]


PT STATUS:   REG ER


:   1945


PHYSICIAN:   CARLOTA MORELAND MD


ADMIT DATE:   22/ER FS


                                   ***Draft***


Date of Exam:22





CHEST 1 VIEW AP/PA ONLY








INDICATION: Shortness of breath and tachycardia.





TECHNIQUE: Frontal chest obtained at 12:03 p.m. and compared to


2020.





FINDINGS: Heart and mediastinal silhouette are normal in


appearance. The lungs are clear. There is no pneumothorax or


pleural fluid.





IMPRESSION: Negative chest.





  Dictated on workstation # [id number]








Dict:   22 1213


Trans:   22 1219


[id number]





Interpreted by:     FAUSTINA MUNOZ MD


Electronically signed by:


   Reviewed:  Reviewed by Me





Departure


Communication (Admissions)


Time/Spoke to Admitting Phy:  13:20


Discussed the case with Dr. Hercules and he agrees to admission to cardiac 

stepdown and he will put in queued orders.  Consult to cardiology.  We discussed

Lovenox and the patient is a high risk for falls and very demented and for that 

reason has not been on blood thinners at this time.  Just Plavix.  We discussed 

the need for wound care of her heels.  We did initiate cefepime but have not 

found any reason to continue antibiotics.


Time/Spoke to Consulting Phy:  13:13


Discussed the case with Dr. Gonsalez, cardiology and he recommends going ahead and 

starting her on the Cardizem drip at 5 mg an hour and sending her down to the 

unit or cardiac stepdown.  He agrees to consult.





Impression





   Primary Impression:  


   Atrial fibrillation with RVR


   Additional Impression:  


   Acute on chronic respiratory failure with hypoxemia


Disposition:   ADMITTED AS INPATIENT


Condition:  Stable





Admissions


Decision to Admit Reason:  Admit from ER (General)


Decision to Admit/Date:  2022


Time/Decision to Admit Time:  13:12





Departure-Patient Inst.


Referrals:  


AUSTIN WELCH MD (PCP)


Primary Care Physician











CARLOTA MORELAND                 2022 12:12

## 2022-06-23 NOTE — DIAGNOSTIC IMAGING REPORT
INDICATION: Shortness of breath and tachycardia.



TECHNIQUE: Frontal chest obtained at 12:03 p.m. and compared to

07/12/2020.



FINDINGS: Heart and mediastinal silhouette are normal in

appearance. The lungs are clear. There is no pneumothorax or

pleural fluid.



IMPRESSION: Negative chest.



Dictated by: 



  Dictated on workstation # [id number]

## 2022-06-23 NOTE — CONSULTATION-CARDIOLOGY
HPI-Cardiology


Cardiology Consultation


Date of Consultation


6/23/22


Date of Admission





Time Seen by Provider:  18:28


Indication:  Atrial fibrillation





HPI


77-year-old lady with history of dementia, unable to provide full history, 

history was obtained with assistance of her daughter.


Patient was brought to the hospital from [facility] due to increased 

shortness of breath, lethargy, she was noted to be tachycardic.  He was noted to

be in atrial fibrillation with a heart rate around 120.  She has known history 

of atrial fibrillation and history of coronary artery disease and a heart attack

over 5 years ago and had cardiac catheterization.  She reportedly had oxygen 

saturation in the 70s.  She has been having increasing pedal edema and 

increasing shortness of breath and diaphoresis.  Follows with Dr. Bustillo.


She moved to this area recently, does not follow with a cardiologist.


She had echocardiogram done in 2020 with ejection fraction 40 to 45%, grade 1 

diastolic dysfunction


Cardiac catheterization done in 2012 with 90% in-stent restenosis of the LAD 

with successful balloon angioplasty, 50% proximal LAD stenosis and 50% proximal 

right coronary artery stenosis with distal right coronary artery stenosis, 

ejection fraction 60%


Has foot ulcers.





Home Medications & Allergies


Allergies:  


Coded Allergies:  


     diazepam (Unverified  Adverse Reaction, Unknown, 8/10/19)


Home Medication List Reviewed:  Yes





PMH-Social-Family Hx


Patient Social History


Marital Status:  


Employed/Student:  retired


2nd Hand Smoke Exposure:  No


Recent Hopitalizations:  No


Have you traveled recently?:  No


Alcohol Use?:  No





Immunizations Up To Date


Date of Influenza Vaccine:  Oct 1, 2011





Past Medical History


Discussed below





Family Medical History


Family Medical Hx


Noncontributory


Family History:  


Patient reports no known family medical history.





Review of Systems-General


Review of Systems


Constitutional:  No chills, No fever; malaise, weakness


EENTM:  No ear discharge, No ear pain


Respiratory:  No cough; dyspnea on exertion; No phlegm; short of breath; No 

wheezing


Cardiovascular:  see HPI; No chest pain, No edema; Hx of Intervention; No 

palpitations


Gastrointestinal:  No abdominal pain, No constipation, No diarrhea, No nausea


Genitourinary:  see HPI; No discharge, No dysuria


Musculoskeletal:  see HPI; No back pain, No joint pain


Skin:  no symptoms reported, see HPI


Psychiatric/Neurological:  No Symptoms Reported, See HPI





All Other Systems Reviewed


Negative Unless Noted:  Yes





Reviewed Test Results


Reviewed Test Results


Lab





Laboratory Tests








Test


 6/23/22


11:45 6/23/22


12:30 6/23/22


12:45 6/23/22


13:43 Range/Units


 


 


White Blood Count


 11.5 H


 


 


 


 4.3-11.0


10^3/uL


 


Red Blood Count


 3.53 L


 


 


 


 3.80-5.11


10^6/uL


 


Hemoglobin 9.4 L    11.5-16.0  g/dL


 


Hematocrit 32 L    35-52  %


 


Mean Corpuscular Volume 91     80-99  fL


 


Mean Corpuscular Hemoglobin 27     25-34  pg


 


Mean Corpuscular Hemoglobin


Concent 29 L


 


 


 


 32-36  g/dL





 


Red Cell Distribution Width 16.6 H    10.0-14.5  %


 


Platelet Count


 411 H


 


 


 


 130-400


10^3/uL


 


Mean Platelet Volume 8.6 L    9.0-12.2  fL


 


Immature Granulocyte % (Auto) 0      %


 


Neutrophils (%) (Auto) 85 H    42-75  %


 


Lymphocytes (%) (Auto) 10 L    12-44  %


 


Monocytes (%) (Auto) 4     0-12  %


 


Eosinophils (%) (Auto) 1     0-10  %


 


Basophils (%) (Auto) 0     0-10  %


 


Neutrophils # (Auto)


 9.8 H


 


 


 


 1.8-7.8


10^3/uL


 


Lymphocytes # (Auto)


 1.1 


 


 


 


 1.0-4.0


10^3/uL


 


Monocytes # (Auto)


 0.4 


 


 


 


 0.0-1.0


10^3/uL


 


Eosinophils # (Auto)


 0.1 


 


 


 


 0.0-0.3


10^3/uL


 


Basophils # (Auto)


 0.0 


 


 


 


 0.0-0.1


10^3/uL


 


Immature Granulocyte # (Auto)


 0.1 


 


 


 


 0.0-0.1


10^3/uL


 


Prothrombin Time 12.9     12.2-14.7  SEC


 


INR Comment 0.9     0.8-1.4  


 


Activated Partial


Thromboplast Time 29 


 


 


 


 24-35  SEC





 


D-Dimer


 1.57 H


 


 


 


 0.00-0.49


UG/ML


 


Sodium Level 139     135-145  MMOL/L


 


Potassium Level 5.3 H    3.6-5.0  MMOL/L


 


Chloride Level 99       MMOL/L


 


Carbon Dioxide Level 25     21-32  MMOL/L


 


Anion Gap 15 H    5-14  MMOL/L


 


Blood Urea Nitrogen 25 H    7-18  MG/DL


 


Creatinine


 0.87 


 


 


 


 0.60-1.30


MG/DL


 


Estimat Glomerular Filtration


Rate 69 


 


 


 


  





 


BUN/Creatinine Ratio 29      


 


Glucose Level 310 H      MG/DL


 


Lactic Acid Level


 5.20 *H


 


 


 2.57 *H


 0.50-2.00


MMOL/L


 


Calcium Level 9.4     8.5-10.1  MG/DL


 


Corrected Calcium 9.9     8.5-10.1  MG/DL


 


Total Bilirubin 0.5     0.1-1.0  MG/DL


 


Aspartate Amino Transf


(AST/SGOT) 30 


 


 


 


 5-34  U/L





 


Alanine Aminotransferase


(ALT/SGPT) 15 


 


 


 


 0-55  U/L





 


Alkaline Phosphatase 159 H      U/L


 


C-Reactive Protein 0.64 H    <0.50  MG/DL


 


Pro-B-Type Natriuretic Peptide 810.3 H    <75.0  PG/ML


 


Total Protein 6.9     6.4-8.2  GM/DL


 


Albumin 3.4     3.2-4.5  GM/DL


 


Blood Gas Puncture Site  RT RADIAL     


 


Blood Gas Patient Temperature  37C     


 


Arterial Blood pH  7.34 *L   7.37-7.43  


 


Arterial Blood Partial


Pressure CO2 


 55 H


 


 


 35-45  MMHG





 


Arterial Blood Partial


Pressure O2 


 79 


 


 


 79-93  MMHG





 


Arterial Blood HCO3  30 H   23-27  MMOL/L


 


Arterial Blood Total CO2


 


 31.4 H


 


 


 21.0-31.0


MMOL/L


 


Arterial Blood Oxygen


Saturation 


 95 


 


 


   %





 


Arterial Blood Base Excess


 


 2.8 H


 


 


 -2.5-2.5


MMOL/L


 


Cornel Test  OK     


 


Blood Gas Ventilator Setting  NO     


 


Blood Gas Inspired Oxygen  4 LITERS     


 


Urine Color   YELLOW    


 


Urine Clarity   SL CLOUDY    


 


Urine pH   5.5   5-9  


 


Urine Specific Gravity   1.020   1.016-1.022  


 


Urine Protein   NEGATIVE   NEGATIVE  


 


Urine Glucose (UA)   NEGATIVE   NEGATIVE  


 


Urine Ketones   NEGATIVE   NEGATIVE  


 


Urine Nitrite   NEGATIVE   NEGATIVE  


 


Urine Bilirubin   NEGATIVE   NEGATIVE  


 


Urine Urobilinogen   0.2   < = 1.0  MG/DL


 


Urine Leukocyte Esterase   1+ H  NEGATIVE  


 


Urine RBC (Auto)   NEGATIVE   NEGATIVE  


 


Urine RBC   RARE    /HPF


 


Urine WBC   10-25 H   /HPF


 


Urine Squamous Epithelial


Cells 


 


 10-25 H


 


  /HPF





 


Urine Crystals   NONE    /LPF


 


Urine Bacteria   NEGATIVE    /HPF


 


Urine Casts   PRESENT    /LPF


 


Urine Hyaline Casts   2-5 H   /LPF


 


Urine Mucus   SMALL H   /LPF


 


Urine Culture Indicated   NO    


 


SARS-CoV-2 RNA (RT-PCR)   Not Detected   Not Detecte  


 


Test


 6/23/22


16:06 6/23/22


17:57 


 


 Range/Units


 


 


Lactic Acid Level


 2.56 *H


 


 


 


 0.50-2.00


MMOL/L


 


Glucometer  111 H     MG/DL











Physical Exam


Physical Exam


Vital Signs





Vital Signs - First Documented








 6/23/22





 11:44


 


Temp 36.3


 


Pulse 124


 


Resp 21


 


B/P (MAP) 123/74 (90)


 


Pulse Ox 96


 


O2 Delivery Nasal Cannula


 


O2 Flow Rate 2.00





Capillary Refill : Less Than 3 Seconds


Height, Weight, BMI


Height: 5'1.00"


Weight: 225lbs. 0oz. [id number]; 29.00 BMI


Method:Estimated


General Appearance:  WD/WN, Mild Distress


Eyes:  Bilateral Eye Normal Inspection, Bilateral Eye PERRL, Bilateral Eye EOMI


HEENT:  PERRL/EOMI, TMs Normal, Normal ENT Inspection, Pharynx Normal, Moist 

Mucous Membranes


Neck:  Full Range of Motion, Normal Inspection, Non Tender, Supple, Carotid 

Bruit


Respiratory:  Chest Non Tender, Normal Breath Sounds, No Accessory Muscle Use, 

No Respiratory Distress


Cardiovascular:  No Edema, No Gallop, No JVD, Normal Peripheral Pulses, Systolic

Murmur, Irregularly Irregular


Gastrointestinal:  Normal Bowel Sounds, No Organomegaly, No Pulsatile Mass, Non 

Tender, Soft


Back:  Normal Inspection, No CVA Tenderness, No Vertebral Tenderness


Extremity:  Normal Capillary Refill, Normal Inspection, Normal Range of Motion, 

Non Tender, No Calf Tenderness, No Pedal Edema


Neurologic/Psychiatric:  Alert, Oriented x3, No Motor/Sensory Deficits, Normal 

Mood/Affect


Skin:  Normal Color, Warm/Dry


Lymphatic:  No Adenopathy





A/P-Cardiology


Admission Diagnosis


Atrial fibrillation


Hypotensive shock


Coronary artery disease


Congestive heart failure, acute on chronic left ventricular diastolic 

dysfunction





Assessment/Plan


Atrial fibrillation with rapid ventricular response.


History of paroxysmal atrial fibrillation, she was given Cardizem bolus in the 

emergency room and started on drip at 5 mg/h


Drip was discontinued due to borderline bradycardia and hypotension.


I started her on IV fluid, did not respond, continue to have systolic blood 

pressure in the 70s.


I will transfer her to ICU and start dopamine drip





Coronary artery disease, history of multiple interventions in the past.


Cardiac catheterization done in 2012 by Dr. Gonsalez showing 90% in-stent 

restenosis in the LAD with successful balloon angioplasty.  She has 50% proximal

LAD stenosis and 50% proximal right coronary artery stenosis treated 

conservatively


Patient has moved back to our area recently.  Does not follow-up with a 

cardiologist.





Hypertension, currently hypotensive, starting dopamine drip and transferring to 

ICU





Shortness of breath, congestive heart failure, acute left ventricular diastolic 

dysfunction, given diuretics and monitor tolerance and response, currently I am 

giving her IV fluid and evaluate her response





History of bradycardia seen by Dr. Dominguez in the past.  Currently normal heart 

rate.





Diabetes mellitus, followed by primary care physician





Advanced dementia





Bilateral foot ulcers.  Wound care consults











CHARBEL GONSALEZ MD              Jun 23, 2022 18:34

## 2022-06-23 NOTE — TELE-ICU PROGRESS NOTE
Progress Note


Tele ICU


78 yo woman with known CHF, DM, CAD , remote stent 2012, HTN, atrial 

fibrillation on Plavix only, dementia - admitted with hypoxemia, worsening 

shortness of breath . Report indicates EF 40-45% in 2020,with gr 1 diastolic 

dysfunction. Cardiology consult reviewed. Pt was initially treated with cardizem

bolus and drip and experienced bradycardia with hypotension- she was placed on 

dopamine per cardiology . 


Per video- HR 75 /77 sats 96%  sleeping comfortably 


CXR reported neg but increased pulmonary vasc redistribution. EKG atrial 

fibrillation with controlled VR, 


LA 5.2-->1.39  Gluc 310-->111 BUN 25 Cr 0.87 Na 139 K 5.3 Cl 99 Bicarb 25 on 

admit    Procal 0.04 DDimer 1.57 CRP 0.64 COVID neg 


CBC WBcs 11,500 Hgb 9.4  Plts 411,000   UA LE + 1 wbcs 10-25 rbcs 10-25 cloudy -

a culture was not sent , however she was given Cefepime and placed on Augmentin-




Orders have already been placed on this pt per primary service and cardiology 

for mgmt of CHF, atrial fibrillation , DM, diabetic foot ulcers.





Focused Exam


Lactate Level


6/23/22 13:43: Lactic Acid Level 2.57*H


6/23/22 16:06: Lactic Acid Level 2.56*H


6/23/22 18:18: Lactic Acid Level 1.39


Height, Weight, BMI


Height: 5'1.00"


Weight: 225lbs. 0oz. 102.806456ik; 33.71 BMI


Method:Estimated











JOSELITO CONNELL DO                Jun 23, 2022 22:26

## 2022-06-23 NOTE — HISTORY & PHYSICAL-HOSPITALIST
History of Present Illness


HPI/Chief Complaint


Antonella Kuo is a 77 year old female with PMH HTN, HLD, T2DM, GERD, CAD, HFrEF, 

AFib, dementia, who presented from [facility] with shortness of breath. 

She is a poor historian due to her dementia. She was found to be in AFib with 

RVR. She has a history of AFib but is not on anticoagulation. She does take 

Plavix. She follows with Dr. Bustillo. She has some foot ulcers for which she is 

currently on Augmentin.


Source:  patient, RN/MD


Date Seen


6/23/22


Time Seen by a Provider:  19:00


Attending Physician


Zack Bustillo MD


PCP


Admitting Physician:


David Payne MD 








Attending Physician:


David Payne MD


Referring Physician





Date of Admission


Jun 23, 2022 at 15:15





Home Medications & Allergies


Home Medications


Reviewed patient Home Medication Reconciliation performed by pharmacy medication

reconciliations technician and/or nursing.


Patients Allergies have been reviewed.





Allergies





Allergies


Coded Allergies


  diazepam (Unverified Adverse Reaction, Unknown, 8/10/19)








Past Medical-Social-Family Hx


Patient Social History


Marrital Status:  


Employed/Student:  retired


Tobacco Use?:  No


Smoking Status:  Never a Smoker


Smokeless Tobacco Frequency:  Never a User


Use of E-Cig and/or Vaping dev:  No


Substance use?:  No


Alcohol Use?:  No


Pt feels they are or have been:  No





Immunizations Up To Date


Date of Influenza Vaccine:  Oct 1, 2011


First/Initial COVID19 Vaccinat:  PT HAS BOTH BUT UNSURE OF WHICH KIND AND WHEN


Second COVID19 Vaccination Damir:  Yes


Tetanus Booster (TDap):  Unknown


Hepatitis A:  No


Hepatitis B:  No





Seasonal Allergies


Seasonal Allergies:  No





Current Status


Pregnancy status:  No


Breastfeeding status:  No


Advance Directives:  Yes


Advance Directive Location:  Copy placed in chart


Communicates:  Verbally


Primary Language:  English


Preferred Spoken Language:  English


Is interpretation needed?:  No


Sensory deficits:  Vision impairment


Implanted or Applied Medical D:  None





Past Medical History


Surgeries:  Gallbladder, Joint Replacement, Orthopedic


High Cholesterol, Hypertension


Stroke


UTI-Chronic


Gastroesophageal Reflux


Arthritis


Diabetes, Insulin dep


Anxiety, Depression


Blood Disorders:  No





Family Medical History





Patient reports no known family medical history.


No Pertinent Family Hx





Review of Systems


Constitutional:  see HPI


Respiratory:  short of breath





Physical Exam


Physical Exam


Vital Signs





Vital Signs - First Documented








 6/23/22





 11:44


 


Temp 36.3


 


Pulse 124


 


Resp 21


 


B/P (MAP) 123/74 (90)


 


Pulse Ox 96


 


O2 Delivery Nasal Cannula


 


O2 Flow Rate 2.00





Capillary Refill : Less Than 3 Seconds


Height, Weight, BMI


Height: 5'1.00"


Weight: 225lbs. 0oz. 102.787979wn; 33.71 BMI


Method:Estimated


General Appearance:  No Apparent Distress, Obese


Neck:  Normal Inspection, Supple


Respiratory:  No Respiratory Distress, Decreased Breath Sounds


Cardiovascular:  No Murmur, Irregularly Irregular


Gastrointestinal:  Normal Bowel Sounds, Soft


Extremity:  Pedal Edema, Other (bilateral foot bandages)


Neurologic/Psychiatric:  Alert, Disoriented


Skin:  Warm/Dry, Pallor





Results


Results/Procedures


Labs


Laboratory Tests


6/23/22 11:45








Patient resulted labs reviewed.


Imaging:  Reviewed Imaging Films, Reviewed Imaging Report





Assessment/Plan


Admission Diagnosis


AFib with RVR


Admission Status:  Inpatient Order (span 2 midnights)


Reason for Inpatient Admission:  


IV antiarrhythmics





Assessment and Plan


AFib with RVR


Lactic acidosis


Chronic HFrEF


   Cardiology consulted


   Started on IV Cardizem


   Lactic acid normalized


   TeleICU consulted





Bilateral foot wounds


   Continue Augmentin


   Wound care consulted





Dementia


Obesity


   Clinically significant, no acute management needs





HTN


HLD


CAD


T2DM


GERD


   Resume home meds after med rec completed





DVT prophylaxis: Lovenox





Critical Care


Critically Ill Patient





Diagnosis/Problems


Diagnosis/Problems





(1) Atrial fibrillation with rapid ventricular response


Status:  Acute


(2) Acute on chronic respiratory failure with hypoxemia


Status:  Acute


(3) HFrEF (heart failure with reduced ejection fraction)


Status:  Chronic


(4) CAD (coronary artery disease)


Status:  Chronic


(5) T2DM (type 2 diabetes mellitus)


Status:  Chronic


(6) HTN (hypertension)


Status:  Chronic


(7) HLD (hyperlipidemia)


Status:  Chronic


(8) GERD (gastroesophageal reflux disease)


Status:  Chronic


(9) Obesity


Status:  Chronic


(10) Wound of foot


Status:  Chronic











DAVID PAYNE MD              Jun 23, 2022 21:58

## 2022-06-24 LAB
BASOPHILS # BLD AUTO: 0 10^3/UL (ref 0–0.1)
BASOPHILS NFR BLD AUTO: 0 % (ref 0–10)
BUN/CREAT SERPL: 31
CALCIUM SERPL-MCNC: 8.6 MG/DL (ref 8.5–10.1)
CHLORIDE SERPL-SCNC: 106 MMOL/L (ref 98–107)
CO2 SERPL-SCNC: 25 MMOL/L (ref 21–32)
CREAT SERPL-MCNC: 0.85 MG/DL (ref 0.6–1.3)
EOSINOPHIL # BLD AUTO: 0.2 10^3/UL (ref 0–0.3)
EOSINOPHIL NFR BLD AUTO: 1 % (ref 0–10)
GFR SERPLBLD BASED ON 1.73 SQ M-ARVRAT: 71 ML/MIN
GLUCOSE SERPL-MCNC: 83 MG/DL (ref 70–105)
HCT VFR BLD CALC: 29 % (ref 35–52)
HGB BLD-MCNC: 8.2 G/DL (ref 11.5–16)
LYMPHOCYTES # BLD AUTO: 2 10^3/UL (ref 1–4)
LYMPHOCYTES NFR BLD AUTO: 18 % (ref 12–44)
MAGNESIUM SERPL-MCNC: 1.9 MG/DL (ref 1.6–2.4)
MANUAL DIFFERENTIAL PERFORMED BLD QL: NO
MCH RBC QN AUTO: 26 PG (ref 25–34)
MCHC RBC AUTO-ENTMCNC: 28 G/DL (ref 32–36)
MCV RBC AUTO: 94 FL (ref 80–99)
MONOCYTES # BLD AUTO: 1 10^3/UL (ref 0–1)
MONOCYTES NFR BLD AUTO: 9 % (ref 0–12)
NEUTROPHILS # BLD AUTO: 7.9 10^3/UL (ref 1.8–7.8)
NEUTROPHILS NFR BLD AUTO: 71 % (ref 42–75)
PLATELET # BLD: 355 10^3/UL (ref 130–400)
PMV BLD AUTO: 8.8 FL (ref 9–12.2)
POTASSIUM SERPL-SCNC: 4.2 MMOL/L (ref 3.6–5)
SODIUM SERPL-SCNC: 141 MMOL/L (ref 135–145)
WBC # BLD AUTO: 11.1 10^3/UL (ref 4.3–11)

## 2022-06-24 RX ADMIN — Medication SCH MLS/HR: at 17:16

## 2022-06-24 RX ADMIN — GABAPENTIN SCH MG: 300 CAPSULE ORAL at 21:27

## 2022-06-24 RX ADMIN — AMOXICILLIN AND CLAVULANATE POTASSIUM SCH MG: 875; 125 TABLET, FILM COATED ORAL at 19:07

## 2022-06-24 RX ADMIN — MORPHINE SULFATE SCH MG: 30 TABLET, EXTENDED RELEASE ORAL at 21:27

## 2022-06-24 RX ADMIN — INSULIN ASPART SCH UNIT: 100 INJECTION, SOLUTION INTRAVENOUS; SUBCUTANEOUS at 21:29

## 2022-06-24 RX ADMIN — MAGNESIUM SULFATE IN DEXTROSE SCH MLS/HR: 10 INJECTION, SOLUTION INTRAVENOUS at 04:23

## 2022-06-24 RX ADMIN — AMOXICILLIN AND CLAVULANATE POTASSIUM SCH MG: 875; 125 TABLET, FILM COATED ORAL at 11:00

## 2022-06-24 RX ADMIN — COLLAGENASE SANTYL SCH GM: 250 OINTMENT TOPICAL at 21:29

## 2022-06-24 RX ADMIN — SODIUM CHLORIDE SCH MLS/HR: 900 INJECTION, SOLUTION INTRAVENOUS at 14:00

## 2022-06-24 RX ADMIN — INSULIN ASPART SCH UNIT: 100 INJECTION, SOLUTION INTRAVENOUS; SUBCUTANEOUS at 17:40

## 2022-06-24 RX ADMIN — POTASSIUM CHLORIDE SCH MEQ: 1500 TABLET, EXTENDED RELEASE ORAL at 04:23

## 2022-06-24 RX ADMIN — SODIUM CHLORIDE SCH MLS/HR: 900 INJECTION, SOLUTION INTRAVENOUS at 01:01

## 2022-06-24 RX ADMIN — ENOXAPARIN SODIUM SCH MG: 100 INJECTION SUBCUTANEOUS at 19:12

## 2022-06-24 RX ADMIN — COLLAGENASE SANTYL SCH GM: 250 OINTMENT TOPICAL at 10:00

## 2022-06-24 RX ADMIN — MEMANTINE HYDROCHLORIDE SCH MG: 5 TABLET ORAL at 21:26

## 2022-06-24 RX ADMIN — POTASSIUM CHLORIDE SCH MLS/HR: 200 INJECTION, SOLUTION INTRAVENOUS at 04:23

## 2022-06-24 RX ADMIN — DOPAMINE HYDROCHLORIDE IN DEXTROSE SCH MLS/HR: 1.6 INJECTION, SOLUTION INTRAVENOUS at 09:36

## 2022-06-24 NOTE — CARDIOLOGY PROGRESS NOTE
Subjective


Date Seen by Provider:  Jun 24, 2022


Time Seen by Provider:  12:13


Subjective/Events-last exam


Patient was seen at bedside, laying down comfortably, feeling better, breathing 

better.


No new complain


Review of Systems


General:  No Chills, No Night Sweats; Fatigue, Malaise; No Appetite, No Other


HEENT:  No Head Aches, No Visual Changes, No Eye Pain, No Ear Pain, No 

Dysphasia, No Sinus Congestion, No Post Nasal Drip, No Sore Throat, No Other


Pulmonary:  No Dyspnea, No Cough, No Pleuritic Chest Pain, No Other


Cardiovascular:  No: Chest Pain, Palpitations, Orthopnea, Paroxysmal Noc. 

Dyspnea, Edema, Lt Headedness, Other





Focused Exam


Lactate Level


6/23/22 13:43: Lactic Acid Level 2.57*H


6/23/22 16:06: Lactic Acid Level 2.56*H


6/23/22 18:18: Lactic Acid Level 1.39








Objective-Cardiology


Exam


Last Set of Vital Signs





Vital Signs








 6/24/22 6/24/22





 11:00 11:40


 


Temp  36.7


 


Pulse 74 


 


Resp 11 


 


B/P (MAP) 151/65 


 


Pulse Ox 97 


 


O2 Delivery Nasal Cannula 


 


O2 Flow Rate 2.00 








I&O











Intake and Output 


 


 6/24/22





 00:00


 


Intake Total 1050 ml


 


Output Total 300 ml


 


Balance 750 ml


 


 


 


Intake Oral 0 ml


 


IV Total 1050 ml


 


Output Urine Total 300 ml


 


Daily Weight Change No








General:  Alert, Cooperative, No Acute Distress


HEENT:  Atraumatic, PERRLA


Neck:  Supple, No JVD, No Thyromegaly


Lungs:  Normal Air Movement, Other (Bilateral rhonchi)


Heart:  Regular Rate, Normal S1, Normal S2, No Murmurs


Abdomen:  Normal Bowel Sounds, Soft, No Tenderness, No Hepatosplenomegaly, No 

Masses


Extremities:  No Clubbing, No Cyanosis, No Edema, Normal Pulses, No 

Tenderness/Swelling


Skin:  No Rashes, No Breakdown, No Significant Lesion


Neuro:  Normal Gait, Normal Speech, Strength at 5/5 X4 Ext, Normal Tone, 

Sensation Intact


Psych/Mental Status:  Mental Status NL, Mood NL





Results


Lab


Laboratory Tests


6/24/22 03:48














A/P-Cardiology


Admission Diagnosis


Atrial fibrillation


Hypotensive shock


Coronary artery disease


Congestive heart failure, acute on chronic left ventricular diastolic dysf

unction





Assessment/Plan


Paroxysmal atrial fibrillation with rapid ventricular response


Converted back to sinus rhythm.  Doing well at this time.


She was treated with Cardizem bolus and a drip then it was discontinued due to 

bradycardia and hypotension.





Sepsis with septic shock, blood pressure is better, clinically better.





Status post hypotension secondary to sepsis and UTI, better at this time, 

receiving antibiotic





Coronary artery disease, history of multiple interventions in the past.


Cardiac catheterization done in 2012 by Dr. Gonsalez showing 90% in-stent 

restenosis in the LAD with successful balloon angioplasty.  She has 50% proximal

LAD stenosis and 50% proximal right coronary artery stenosis treated 

conservatively


Patient has moved back to our area recently.  Does not follow-up with a 

cardiologist.





Hypertension, patient was hypotensive and she was transferred to the intensive 

care unit, currently better.





Shortness of breath, congestive heart failure, acute left ventricular diastolic 

dysfunction, given diuretics and monitor tolerance and response, currently I am 

giving her IV fluid and evaluate her response





History of bradycardia seen by Dr. Dominguez in the past.  Currently normal heart 

rate.





Diabetes mellitus, followed by primary care physician





Advanced dementia





Bilateral foot ulcers.  Wound care consults











CHARBEL GONSALEZ MD              Jun 24, 2022 12:15

## 2022-06-24 NOTE — TELE-ICU PROGRESS NOTE
Subjective


Date Seen by a Provider:  Jun 24, 2022


Time Seen by a Provider:  11:38


Subjective/Events-last exam


(Tele-ICU Physician , Progress Note ) 





Available chart/ vitals / labs / Images reviewed 





Video assessment done using  teleICU camera, rest of exam as per RN 


Discussed with RN , EXAM AS PER RN 


Events overnight :   on pressors 


Afebrile  


FiO2 - 2l





I/O = pos 


Drips:   d5LR 60 


Pressors: dopa  6 





Consultants:  soy 





Hospital course: 


(6/23) 76 Y/O Female  (RVR) and Hypotension. Dopamine / Cardizem 








A/P 


Af ib RVR


- occ cardizem gtt


 - on dopa  foe presumed card shock - ECHO pending ,


- management as per cards 





Shock


- presumed cardiohenic, less likely septic 


 - on dopa 6 


- on abx too 








Mild resp acidos 


- follow , mental status as baseline  ( h/o dementia , confused at baseline 








Chronic HFrEF/ CAD 


- as per cards 





Bilateral foot wounds


-Augmentin


-Wound care consulted





DM 


 -ISS 





Obesity





 


Lines :    (Central Line Necessity Reviewed) 


Jama: 


OG: 


Nutrition:  


Analgesia:  


Anxiety/ delirium  





VTE Prophylaxis:  lov 40 


Stress Ulcer Prophylaxis:  na





 


Plans in collaboration with bedside consultants and IM MDs. 


Discussed with RN to reach out if any questions or concerns 


A total of 25  minutes of critical care time was devoted to this patient today, 

required to treat and/or prevent further deterioration of critical care 

condition ( as above) .





Sepsis Event


Evaluation


Height, Weight, BMI


Height: 5'1.00"


Weight: 225lbs. 0oz. 102.317609za; 33.92 BMI


Method:Estimated





Focused Exam


Lactate Level


6/23/22 13:43: Lactic Acid Level 2.57*H


6/23/22 16:06: Lactic Acid Level 2.56*H


6/23/22 18:18: Lactic Acid Level 1.39





Exam


Exam


Patient acknowledged, consented, and participated in this virtual visit which 

was conducted using real time audio/video


Vital Signs








  Date Time  Temp Pulse Resp B/P (MAP) Pulse Ox O2 Delivery O2 Flow Rate FiO2


 


6/24/22 11:00  74 11 151/65 97 Nasal Cannula 2.00 


 


6/24/22 10:00  75 13 141/62 94 Nasal Cannula 2.00 


 


6/24/22 09:36  80  114/58    


 


6/24/22 09:00  65 15 137/64 100 Nasal Cannula 2.00 


 


6/24/22 08:00     99 Nasal Cannula 2.00 


 


6/24/22 08:00  70 18 112/65 99 Nasal Cannula 2.00 


 


6/24/22 07:36 36.2       


 


6/24/22 07:00  62 13 120/47 100 Nasal Cannula 2.00 


 


6/24/22 07:00  64      


 


6/24/22 06:00  61 22 118/43 100 Nasal Cannula 2.00 


 


6/24/22 05:00  65 21 108/49 99 Nasal Cannula 2.00 


 


6/24/22 04:00 36.1       


 


6/24/22 04:00  64 12 132/44 100 Nasal Cannula 2.00 


 


6/24/22 04:00     99 Nasal Cannula 2.00 


 


6/24/22 03:00  50 12 87/42 100 Nasal Cannula 2.00 


 


6/24/22 02:00  60 12 117/51 100 Nasal Cannula 2.00 


 


6/24/22 01:00  59      


 


6/24/22 01:00  56 12 118/61 99 Nasal Cannula 2.00 


 


6/24/22 00:00  60 12 105/63 99 Nasal Cannula 2.00 


 


6/23/22 23:59      Nasal Cannula 2.00 


 


6/23/22 23:24 35.9       


 


6/23/22 23:00  59 12 112/47 96 Nasal Cannula 2.00 


 


6/23/22 22:00  62 17 133/52 96 Nasal Cannula 2.00 


 


6/23/22 21:54  55  65/35    


 


6/23/22 21:38  57  78/49    


 


6/23/22 21:00  62 12 89/50 97 Nasal Cannula 2.00 


 


6/23/22 20:14  61  83/54    


 


6/23/22 20:00      Nasal Cannula 2.00 


 


6/23/22 20:00  61 14 79/56 100 Nasal Cannula 2.00 


 


6/23/22 20:00 35.7       


 


6/23/22 19:12  61      


 


6/23/22 19:00  73 15 89/53 97 Nasal Cannula 2.00 


 


6/23/22 18:45  59  126/65    


 


6/23/22 18:30  53 9 81/51 (61) 98   


 


6/23/22 18:20  63 15  99   


 


6/23/22 18:05  61 12  99   


 


6/23/22 18:00  62 11 62/46 (51) 100   


 


6/23/22 17:50  59 14  98   


 


6/23/22 17:45  58 14 85/41 (49) 99   


 


6/23/22 17:36        


 


6/23/22 17:33   15 87/40 (50) 98   


 


6/23/22 17:30  59 12 83/42 (50) 99   


 


6/23/22 17:21  64 8  98   


 


6/23/22 17:20  65 22  98   


 


6/23/22 17:15  72 13 114/66 (82) 99   


 


6/23/22 17:15  72 13 114/66 (82) 99   


 


6/23/22 17:00  66 17 104/58 (71) 97   


 


6/23/22 17:00  66 17 104/58 (71) 97   


 


6/23/22 16:50  69 10  96   


 


6/23/22 16:45   18 100/51 (69) 97   


 


6/23/22 16:30   9 122/70 (84) 99   


 


6/23/22 16:15   15 126/78 (91) 92   


 


6/23/22 16:10   13 129/101 (112) 96   


 


6/23/22 16:06   11  96   


 


6/23/22 16:00      Nasal Cannula 4.00 


 


6/23/22 15:30  62      


 


6/23/22 15:16 36.5 70 16 118/59 (78) 99 Nasal Cannula 4.00 


 


6/23/22 14:28 36.3 74 16 104/57 99 Nasal Cannula 4.00 





       4.00 


 


6/23/22 11:44 36.3 124 21 123/74 (90) 96 Nasal Cannula 4.00 


 


6/23/22 11:44      Nasal Cannula 2.00 














I & O 


 


 6/24/22





 06:59


 


Intake Total 2050 ml


 


Output Total 600 ml


 


Balance 1450 ml








Height & Weight


Height: 5'1.00"


Weight: 225lbs. 0oz. 102.275020yd; 33.92 BMI


Method:Estimated


General Appearance:  No Apparent Distress, Obese


HEENT:  PERRL/EOMI, TMs Normal, Normal ENT Inspection, Pharynx Normal, Moist 

Mucous Membranes


Neck:  Normal Inspection, Supple


Respiratory:  No Respiratory Distress, Decreased Breath Sounds


Cardiovascular:  No Murmur, Irregularly Irregular


Capillary Refill:  Less Than 3 Seconds


Gastrointestinal:  normal bowel sounds, non tender, soft


Extremity:  Pedal Edema, Other (bilateral foot bandages)


Neurologic/Psychiatric:  Alert, Disoriented


Skin:  Warm/Dry, Pallor


Lymphatic:  No Adenopathy





Results


Lab


Laboratory Tests


6/23/22 11:45








6/24/22 03:48











Assessment/Plan


Assessment/Plan


`











TEE WEISS MD         Jun 24, 2022 11:39

## 2022-06-24 NOTE — ST DYSPHAGIA EVALUATION
Speech Evaluation-General


Medical Diagnosis


Atrial Fibrillation with RVR


Onset Date:  Jun 24, 2022





Therapy Diagnosis


Therapy Diagnosis:  Oral Dysphagia





Precautions


Precautions:  Fall, Aspiration


Precautions/Isolations:  Aspiration, Fall Prevention, Standard Precautions





Referral


Referring Physician:  Dr. Hercules


Reason for Referral:  Evaluation/Treatment





Medical History


Current History


The patient is a 77 year-old female with a past medical history of high 

cholesterol, HTN, stroke, dementia, GERD,and diabetes, who presented to 

[facility] with shortness of air and atrial fibrillation.





6/23/22: CXR: IMPRESSION: Negative chest.


Reviewed History:  Yes





Speech PLF/Current-Dysphagia


Prior Level of Function


Per chart review, the patient tolerates thin liquids and a "soft" diet 

consistency per family. The patient is unable to provide information regarding 

her prior P.O. intake but remains pleasant and cooperative throughout the 

evaluation.





Subjective


The patient was lying in bed, awake and alert upon entrance to her room by the 

clinician. The patient greeted the clinician appropriately and was agreeable to 

participation in the clinical bedside swallowing evaluation. The patient was 

positioned upright for the exam. The patient is currently receiving supplemental

oxygen at a rate of 2 lpm with SpO2% at 100% prior to and throughout the 

evaluation.





Cognitive Status


Patient Orientation:  Person





Oral Motor Skills


Dentition:  Natural


Ability to Follow Directions:  Fair


Oral Expression Ability:  Moderate Impairment





Voice


Voice Phonatory-Based Quality:  Weak


Voice Pitch:  Normal


Voice Loudness:  Normal





Face


Facial Symmetry:  Symmetrical





Oral-Facial Assessment


Oral-Facial Dentition:  Normal


Labial Seal Description:  Weak


Lingual Protrusion:  Normal


Lingual ROM:  Normal


Lingual Strength:  Normal


Volitional Dry Swallow:  Yes


Voluntary Cough:  No


Can Clear Throat Volitionally:  No


Productive Cough:  No


Productive Throat Clear:  No





Dysphagia Evaluation


Consistencies Presented:  Regular, Thin Liquid, Pureed


No oral pocketing or anterior spillage was present with any consistency tested. 

Prolonged mastication was present with the solid consistency and the patient 

required a thin liquid bolus to initiate posterior transfer. Complete clearance 

of bolus material was achieved from the oral cavity with no residual visualized.


No pharyngeal deficits were present throughout the evaluation. Laryngeal 

elevation was present to palpation.


Dietary Recommendations:  Mechanical Soft (Dysphagia Two)


Liquid Recommendations:  Thin





Recommendations:


- Dysphagia two consistency diet with thin liquids, as tolerated.


- Fully upright and alert for PO intake.


- Assistance with meal set-up and feeding, as necessary.


- Small bites and sips, only.


- Assess for oral pocketing throughout and following PO intake. 


- Crush medication and place in puree for administration.


- Monitor for s/s of suspected aspiration with PO intake. If demonstrated, 

contact speech pathology.





The recommendations were provided to the patient and the RN following the 

evaluation. Additionally, the recommendations were placed on the in-room white 

board.


Dysphagia Evaluation Summary


The patient presents with oral dysphagia characterized by reduced mastication 

coordination resulting in prolonged mastication of solid consistencies and bolus

formation.





Speech-Plan


Treatment Plan


Speech Therapy Treatment Plan:  Discontinue ST


Treatment Duration:  Jun 24, 2022


Frequency:  1 time per week


Estimated Hrs Per Day:  .5 hour per day


Rehab Potential:  Fair


Pt/Family Agrees to Plan:  Yes





Safety Risks/Education


Teaching Recipient:  Patient


Teaching Methods:  Discussion


Response to Teaching:  Unable to Comprehend


Education Topics Provided:  


Results, Recommendations, Plan of Care





Time


Speech Therapy Time In:  09:31


Speech Therapy Time Out:  09:51


Total Billed Time:  20


Billed Treatment Time


1, RACHNAEVS, DYST


ELVER Peralta               Jun 24, 2022 09:56

## 2022-06-24 NOTE — WOUND CARE ASSESSMENT
Wound Care Assessment


Date Seen by Provider:  Jun 24, 2022


Time Seen by Provider:  10:00


Chief Complaint


1. Right 5 MTH ulcer


2. R. heel ulcer


3. S2 pressure ulcer gluteal cleft


HPI


This pleasant 77 year old is admitted with atrial fibrillation and RVR. She is 

demented and a poor historian. Her history is primarily obtained from other 

records. She is under the care of a wound care center in [place]. She does have 2

unstageable pressure ulcers of R. 5 MTH and R. heel. These were being treated 

with Santyl (appropriately) as an outpatient. She also has a S2 pressure ulcer 

of the gluteal cleft. She does have a h/o DM2 but blood sugars controlled in 

hospital. She does also have anemia. She is currently on cefepime. Her wound 

healing will be complicated by age, diabetes, anemia, CAD, dementia, and 

immobility.


Past Medical History:  Admits Diabetes Type II, Admits Heart Disease


Anemia, CAD, CHF, atrial fibrillation with RVR, Dementia


Smoking Status:  Never a Smoker


Review of Systems


Neurological:  Weakness





Exam





Vital Signs








  Date Time  Temp Pulse Resp B/P (MAP) Pulse Ox O2 Delivery O2 Flow Rate FiO2


 


6/24/22 11:40 36.7       


 


6/24/22 11:00  74 11 151/65 97 Nasal Cannula 2.00 





Capillary Refill : Less Than 3 Seconds


General Appearance:  WD/WN, no apparent distress


Extremities:  no pedal edema


Neurologic/Psychiatric:  alert, normal mood/affect, other (pleasantly demented)


Skin:  normal color, warm/dry


Skin Problem Location:  other (R. foot, gluteal cleft)


Wound assessments:


1. R. 5 MTH: 3.5x2.0x0.2. The epithelialization is none, there is no tunneling 

or undermining. Drainage is large and serous, granulation is none. necrotic is 

large and slough and eschar. The margins show epibole. There is foul odor


2. R. heel: 3.5x2.0x0.2. The epithelialization is none, there is no tunneling or

undermining. Drainage is large and serous, granulation is none. necrotic is 

large and slough. The margins show epibole. There is foul odor


3. Gluteal cleft: 2x1x0.2. The epithelialization is none. There is no tunneling 

or undermining. Drainage is medium and serous. There is no granulation or 

necrotic. The margins are flat. There is hyperpigmentation in periwound





Results


Laboratory Tests


6/23/22 11:45: 


White Blood Count 11.5H, Red Blood Count 3.53L, Hemoglobin 9.4L, Hematocrit 32L,

Mean Corpuscular Volume 91, Mean Corpuscular Hemoglobin 27, Mean Corpuscular 

Hemoglobin Concent 29L, Red Cell Distribution Width 16.6H, Platelet Count 411H, 

Mean Platelet Volume 8.6L, Immature Granulocyte % (Auto) 0, Neutrophils (%) 

(Auto) 85H, Lymphocytes (%) (Auto) 10L, Monocytes (%) (Auto) 4, Eosinophils (%) 

(Auto) 1, Basophils (%) (Auto) 0, Neutrophils # (Auto) 9.8H, Lymphocytes # 

(Auto) 1.1, Monocytes # (Auto) 0.4, Eosinophils # (Auto) 0.1, Basophils # (Auto)

0.0, Immature Granulocyte # (Auto) 0.1, Prothrombin Time 12.9, INR Comment 0.9, 

Activated Partial Thromboplast Time 29, D-Dimer 1.57H, Sodium Level 139, 

Potassium Level 5.3H, Chloride Level 99, Carbon Dioxide Level 25, Anion Gap 15H,

Blood Urea Nitrogen 25H, Creatinine 0.87, Estimat Glomerular Filtration Rate 69,

BUN/Creatinine Ratio 29, Glucose Level 310H, Lactic Acid Level 5.20*H, Calcium 

Level 9.4, Corrected Calcium 9.9, Total Bilirubin 0.5, Aspartate Amino Transf 

(AST/SGOT) 30, Alanine Aminotransferase (ALT/SGPT) 15, Alkaline Phosphatase 159H

, C-Reactive Protein 0.64H, Pro-B-Type Natriuretic Peptide 810.3H, Total Protein

6.9, Albumin 3.4, Procalcitonin 0.04


6/23/22 12:30: 


Blood Gas Puncture Site RT RADIAL, Blood Gas Patient Temperature 37C, Arterial 

Blood pH 7.34*L, Arterial Blood Partial Pressure CO2 55H, Arterial Blood Partial

Pressure O2 79, Arterial Blood HCO3 30H, Arterial Blood Total CO2 31.4H, 

Arterial Blood Oxygen Saturation 95, Arterial Blood Base Excess 2.8H, Cornel Test

OK, Blood Gas Ventilator Setting NO, Blood Gas Inspired Oxygen 4 LITERS


6/23/22 12:45: 


Urine Color YELLOW, Urine Clarity SL CLOUDY, Urine pH 5.5, Urine Specific 

Gravity 1.020, Urine Protein NEGATIVE, Urine Glucose (UA) NEGATIVE, Urine 

Ketones NEGATIVE, Urine Nitrite NEGATIVE, Urine Bilirubin NEGATIVE, Urine 

Urobilinogen 0.2, Urine Leukocyte Esterase 1+H, Urine RBC (Auto) NEGATIVE, Urine

RBC RARE, Urine WBC 10-25H, Urine Squamous Epithelial Cells 10-25H, Urine 

Crystals NONE, Urine Bacteria NEGATIVE, Urine Casts PRESENT, Urine Hyaline Casts

2-5H, Urine Mucus SMALLH, Urine Culture Indicated NO, SARS-CoV-2 RNA (RT-PCR) 

Not Detected


6/23/22 13:43: Lactic Acid Level 2.57*H


6/23/22 16:06: Lactic Acid Level 2.56*H


6/23/22 17:57: Glucometer 111H


6/23/22 18:18: Lactic Acid Level 1.39


6/24/22 00:12: Glucometer 67L


6/24/22 03:48: 


White Blood Count 11.1H, Red Blood Count 3.12L, Hemoglobin 8.2L, Hematocrit 29L,

Mean Corpuscular Volume 94, Mean Corpuscular Hemoglobin 26, Mean Corpuscular 

Hemoglobin Concent 28L, Red Cell Distribution Width 16.4H, Platelet Count 355, 

Mean Platelet Volume 8.8L, Immature Granulocyte % (Auto) 0, Neutrophils (%) 

(Auto) 71, Lymphocytes (%) (Auto) 18, Monocytes (%) (Auto) 9, Eosinophils (%) 

(Auto) 1, Basophils (%) (Auto) 0, Neutrophils # (Auto) 7.9H, Lymphocytes # 

(Auto) 2.0, Monocytes # (Auto) 1.0, Eosinophils # (Auto) 0.2, Basophils # (Auto)

0.0, Immature Granulocyte # (Auto) 0.0, Sodium Level 141, Potassium Level 4.2, 

Chloride Level 106, Carbon Dioxide Level 25, Anion Gap 10, Blood Urea Nitrogen 

26H, Creatinine 0.85, Estimat Glomerular Filtration Rate 71, BUN/Creatinine 

Ratio 31, Glucose Level 83, Calcium Level 8.6, Magnesium Level 1.9


6/24/22 10:19: Glucometer 129H





Microbiology


6/23/22 Urine Culture - Final, Complete


          NO GROWTH





Microbiology


6/23/22 Urine Culture - Final, Complete


          NO GROWTH





Assessment/Plan/Dx


Assessment:





1. Unstageable pressure ulcers of R. 5 MTH and heel (presumed infection)


2. Stage 2 pressure ulcer gluteal cleft


3. Anemia


4. DM2


5. Dementia


6. Immobility





Plan: 


1. Cleanse with vashe daily. Apply Santyl to wound bed and cover wtih BFD. 

Change daily


2. Cleanse with vashe daily. Thick layer of barrier ointment. Cover with BFD. 

Change twice daily 


3. Per primary team


4. Per primary team


5. Per primray team


6. Off load with primo boots, BFD and frequent positional changes











OBED VASQUEZ MD            Jun 24, 2022 11:51

## 2022-06-24 NOTE — PROGRESS NOTE - HOSPITALIST
Subjective


HPI/CC On Admission


Date Seen by Provider:  Jun 24, 2022


Time Seen by Provider:  10:25


Antonella Kuo is a 77 year old female with PMH HTN, HLD, T2DM, GERD, CAD, HFrEF, 

AFib, dementia, who presented from [facility] with shortness of breath. 

She is a poor historian due to her dementia. She was found to be in AFib with R

VR. She has a history of AFib but is not on anticoagulation. She does take 

Plavix. She follows with Dr. Bustillo. She has some foot ulcers for which she is 

currently on Augmentin.


Subjective/Events-last exam


She is laying in bed. She denies complaints. She is not short of breath. She 

denies pain.





Focused Exam


Lactate Level


6/23/22 13:43: Lactic Acid Level 2.57*H


6/23/22 16:06: Lactic Acid Level 2.56*H


6/23/22 18:18: Lactic Acid Level 1.39








Objective


Exam


Vital Signs





Vital Signs








  Date Time  Temp Pulse Resp B/P (MAP) Pulse Ox O2 Delivery O2 Flow Rate FiO2


 


6/24/22 18:00  81 10 135/66 95 Nasal Cannula 2.00 


 


6/24/22 11:40 36.7       





Capillary Refill : Less Than 3 Seconds


General Appearance:  No Apparent Distress, Obese


Respiratory:  Lungs Clear, No Respiratory Distress


Cardiovascular:  Regular Rate, Rhythm, No Murmur


Gastrointestinal:  Normal Bowel Sounds, Soft


Extremity:  Non Tender, Pedal Edema


Neurologic/Psychiatric:  Alert, Normal Mood/Affect


Skin:  Other (lower extremity wounds)





Results/Procedures


Lab


Laboratory Tests


6/24/22 03:48








Patient resulted labs reviewed.


Imaging:  Reviewed Imaging Films, Reviewed Imaging Report





Assessment/Plan


Assessment and Plan


Assess & Plan/Chief Complaint


AFib with RVR


Acute on chronic HFpEF


Shock


   Cardiogenic shock vs hypovolemic shock


   Cardiology following


   Weaning Dopamine, BP improved


   Echo with EF 60%, grade II diastolic dysfunction


   Not septic


   UA and CXR unremarkable


   Blood cultures appear contaminated


   Continue Augmentin for leg wounds





Bilateral foot wounds


   Continue Augmentin


   Wound care following





Dementia


Obesity


   Clinically significant, no acute management needs





HTN


HLD


CAD


T2DM


GERD


   Resume home meds





DVT prophylaxis: Lovenox





Lactic acidosis, resolved





Critical Care


Critically Ill Patient





Diagnosis/Problems


Diagnosis/Problems





(1) Shock


Status:  Acute


(2) Atrial fibrillation with rapid ventricular response


Status:  Acute


(3) Acute on chronic respiratory failure with hypoxemia


Status:  Acute


(4) HFrEF (heart failure with reduced ejection fraction)


Status:  Chronic


(5) CAD (coronary artery disease)


Status:  Chronic


(6) T2DM (type 2 diabetes mellitus)


Status:  Chronic


(7) HTN (hypertension)


Status:  Chronic


(8) HLD (hyperlipidemia)


Status:  Chronic


(9) GERD (gastroesophageal reflux disease)


Status:  Chronic


(10) Obesity


Status:  Chronic


(11) Wound of foot


Status:  Chronic











DAVID PAYNE MD              Jun 24, 2022 18:32

## 2022-06-25 LAB
BUN/CREAT SERPL: 28
CALCIUM SERPL-MCNC: 8.5 MG/DL (ref 8.5–10.1)
CHLORIDE SERPL-SCNC: 105 MMOL/L (ref 98–107)
CO2 SERPL-SCNC: 29 MMOL/L (ref 21–32)
CREAT SERPL-MCNC: 0.72 MG/DL (ref 0.6–1.3)
GFR SERPLBLD BASED ON 1.73 SQ M-ARVRAT: 87 ML/MIN
GLUCOSE SERPL-MCNC: 123 MG/DL (ref 70–105)
MAGNESIUM SERPL-MCNC: 1.8 MG/DL (ref 1.6–2.4)
POTASSIUM SERPL-SCNC: 4.3 MMOL/L (ref 3.6–5)
SODIUM SERPL-SCNC: 143 MMOL/L (ref 135–145)

## 2022-06-25 RX ADMIN — INSULIN ASPART SCH UNIT: 100 INJECTION, SOLUTION INTRAVENOUS; SUBCUTANEOUS at 06:48

## 2022-06-25 RX ADMIN — CLOPIDOGREL BISULFATE SCH MG: 75 TABLET, FILM COATED ORAL at 08:22

## 2022-06-25 RX ADMIN — MAGNESIUM SULFATE IN DEXTROSE SCH MLS/HR: 10 INJECTION, SOLUTION INTRAVENOUS at 06:47

## 2022-06-25 RX ADMIN — BUSPIRONE HYDROCHLORIDE SCH MG: 5 TABLET ORAL at 17:06

## 2022-06-25 RX ADMIN — ASPIRIN SCH MG: 81 TABLET ORAL at 08:22

## 2022-06-25 RX ADMIN — POTASSIUM CHLORIDE SCH MLS/HR: 200 INJECTION, SOLUTION INTRAVENOUS at 06:46

## 2022-06-25 RX ADMIN — MEMANTINE HYDROCHLORIDE SCH MG: 5 TABLET ORAL at 08:23

## 2022-06-25 RX ADMIN — GABAPENTIN SCH MG: 300 CAPSULE ORAL at 20:55

## 2022-06-25 RX ADMIN — INSULIN ASPART SCH UNIT: 100 INJECTION, SOLUTION INTRAVENOUS; SUBCUTANEOUS at 12:01

## 2022-06-25 RX ADMIN — AMOXICILLIN AND CLAVULANATE POTASSIUM SCH MG: 875; 125 TABLET, FILM COATED ORAL at 17:06

## 2022-06-25 RX ADMIN — SODIUM CHLORIDE SCH MLS/HR: 900 INJECTION, SOLUTION INTRAVENOUS at 10:32

## 2022-06-25 RX ADMIN — BUSPIRONE HYDROCHLORIDE SCH MG: 5 TABLET ORAL at 08:23

## 2022-06-25 RX ADMIN — DOPAMINE HYDROCHLORIDE IN DEXTROSE SCH MLS/HR: 1.6 INJECTION, SOLUTION INTRAVENOUS at 18:47

## 2022-06-25 RX ADMIN — PANTOPRAZOLE SCH MG: 20 TABLET, DELAYED RELEASE ORAL at 08:23

## 2022-06-25 RX ADMIN — POTASSIUM CHLORIDE SCH MEQ: 1500 TABLET, EXTENDED RELEASE ORAL at 06:47

## 2022-06-25 RX ADMIN — AMOXICILLIN AND CLAVULANATE POTASSIUM SCH MG: 875; 125 TABLET, FILM COATED ORAL at 08:23

## 2022-06-25 RX ADMIN — ENOXAPARIN SODIUM SCH MG: 100 INJECTION SUBCUTANEOUS at 15:20

## 2022-06-25 RX ADMIN — GABAPENTIN SCH MG: 100 CAPSULE ORAL at 08:30

## 2022-06-25 RX ADMIN — COLLAGENASE SANTYL SCH GM: 250 OINTMENT TOPICAL at 08:24

## 2022-06-25 RX ADMIN — INSULIN ASPART SCH UNIT: 100 INJECTION, SOLUTION INTRAVENOUS; SUBCUTANEOUS at 15:59

## 2022-06-25 RX ADMIN — COLLAGENASE SANTYL SCH GM: 250 OINTMENT TOPICAL at 21:01

## 2022-06-25 RX ADMIN — ACETAMINOPHEN PRN MG: 325 TABLET ORAL at 23:56

## 2022-06-25 RX ADMIN — GABAPENTIN SCH MG: 100 CAPSULE ORAL at 15:19

## 2022-06-25 RX ADMIN — SODIUM CHLORIDE SCH MLS/HR: 900 INJECTION, SOLUTION INTRAVENOUS at 06:46

## 2022-06-25 RX ADMIN — SODIUM CHLORIDE SCH MLS/HR: 900 INJECTION, SOLUTION INTRAVENOUS at 18:51

## 2022-06-25 RX ADMIN — Medication SCH MLS/HR: at 16:56

## 2022-06-25 RX ADMIN — PAROXETINE HYDROCHLORIDE SCH MG: 20 TABLET, FILM COATED ORAL at 08:23

## 2022-06-25 RX ADMIN — INSULIN ASPART SCH UNIT: 100 INJECTION, SOLUTION INTRAVENOUS; SUBCUTANEOUS at 21:02

## 2022-06-25 RX ADMIN — MEMANTINE HYDROCHLORIDE SCH MG: 5 TABLET ORAL at 20:55

## 2022-06-25 RX ADMIN — MORPHINE SULFATE SCH MG: 30 TABLET, EXTENDED RELEASE ORAL at 20:56

## 2022-06-25 RX ADMIN — MORPHINE SULFATE SCH MG: 30 TABLET, EXTENDED RELEASE ORAL at 08:23

## 2022-06-25 NOTE — CARDIOLOGY PROGRESS NOTE
Subjective


Date Seen by Provider:  Jun 25, 2022


Time Seen by Provider:  13:38


Subjective/Events-last exam


Patient was seen and evaluated at bedside, laying down comfortably, no new 

complaint.


Review of Systems


General:  No Chills, No Night Sweats, No Fatigue, No Malaise, No Appetite, No 

Other


HEENT:  No Head Aches, No Visual Changes, No Eye Pain, No Ear Pain, No Dysphas

ia, No Sinus Congestion, No Post Nasal Drip, No Sore Throat, No Other


Pulmonary:  No Dyspnea, No Cough, No Pleuritic Chest Pain, No Other


Cardiovascular:  No: Chest Pain, Palpitations, Orthopnea, Paroxysmal Noc. 

Dyspnea, Edema, Lt Headedness, Other





Focused Exam


Lactate Level


6/23/22 13:43: Lactic Acid Level 2.57*H


6/23/22 16:06: Lactic Acid Level 2.56*H


6/23/22 18:18: Lactic Acid Level 1.39








Objective-Cardiology


Exam


Last Set of Vital Signs





Vital Signs








 6/25/22 6/25/22 6/25/22





 08:00 10:00 12:45


 


Temp 36.0  


 


Pulse   64


 


Resp  15 


 


B/P (MAP)  127/54 


 


Pulse Ox  97 


 


O2 Delivery  Nasal Cannula 


 


O2 Flow Rate  2.00 








I&O











Intake and Output 


 


 6/25/22





 00:00


 


Intake Total 2600 ml


 


Output Total 2150 ml


 


Balance 450 ml


 


 


 


Intake Oral 600 ml


 


IV Total 2000 ml


 


Output Urine Total 2150 ml








General:  Alert, Cooperative, No Acute Distress


HEENT:  Atraumatic, PERRLA


Neck:  Supple, No JVD, No Thyromegaly


Lungs:  Normal Air Movement, Other (Bilateral rhonchi)


Heart:  Regular Rate, Normal S1, Normal S2, No Murmurs


Abdomen:  Normal Bowel Sounds, Soft, No Tenderness, No Hepatosplenomegaly, No 

Masses


Extremities:  No Clubbing, No Cyanosis, No Edema, Normal Pulses, No 

Tenderness/Swelling


Skin:  No Rashes, No Breakdown, No Significant Lesion


Neuro:  Normal Gait, Normal Speech, Strength at 5/5 X4 Ext, Normal Tone, 

Sensation Intact


Psych/Mental Status:  Mental Status NL, Mood NL





Results


Lab


Laboratory Tests


6/25/22 03:29














A/P-Cardiology


Admission Diagnosis


Atrial fibrillation


Hypotensive shock


Coronary artery disease


Congestive heart failure, acute on chronic left ventricular diastolic 

dysfunction





Assessment/Plan


Paroxysmal atrial fibrillation with rapid ventricular response


Converted back to sinus rhythm.  Doing well at this time.


She was treated with Cardizem bolus and a drip then it was discontinued due to 

bradycardia and hypotension.


Currently heart rate and blood pressure are stable.  Continue to monitor





Sepsis with septic shock, patient was on dopamine drip


Blood pressure is better, she is clinically more stable.  Continue to monitor





Status post hypotension secondary to sepsis and UTI, better at this time, 

receiving antibiotic





Coronary artery disease, history of multiple interventions in the past.


Cardiac catheterization done in 2012 by Dr. Gonsalez showing 90% in-stent 

restenosis in the LAD with successful balloon angioplasty.  She has 50% proximal

LAD stenosis and 50% proximal right coronary artery stenosis treated 

conservatively


Patient has moved back to our area recently.  Does not follow-up with a 

cardiologist.





Hypertension, better control at this point.  Continue to monitor





Shortness of breath, congestive heart failure, acute left ventricular diastolic 

dysfunction, given diuretics and monitor tolerance and response, currently I am 

giving her IV fluid and evaluate her response





History of bradycardia seen by Dr. Dominguez in the past.  Currently normal heart 

rate.





Diabetes mellitus, followed by primary care physician





Advanced dementia





Bilateral foot ulcers.  Wound care consults











CHARBEL GONSALEZ MD              Jun 25, 2022 13:39

## 2022-06-25 NOTE — TELE-ICU PROGRESS NOTE
Subjective


Date Seen by a Provider:  Jun 25, 2022


Time Seen by a Provider:  11:05





Sepsis Event


Evaluation


Height, Weight, BMI


Height: 5'1.00"


Weight: 225lbs. 0oz. 102.872672bh; 33.92 BMI


Method:Estimated





Focused Exam


Lactate Level


6/23/22 13:43: Lactic Acid Level 2.57*H


6/23/22 16:06: Lactic Acid Level 2.56*H


6/23/22 18:18: Lactic Acid Level 1.39





Exam


Exam


Patient acknowledged, consented, and participated in this virtual visit which 

was conducted using real time audio/video


Vital Signs








  Date Time  Temp Pulse Resp B/P (MAP) Pulse Ox O2 Delivery O2 Flow Rate FiO2


 


6/25/22 10:00  67 15 127/54 97 Nasal Cannula 2.00 


 


6/25/22 09:00  78 15 153/67 98 Nasal Cannula 2.00 


 


6/25/22 08:15     98 Nasal Cannula 2.00 


 


6/25/22 08:00 36.0       


 


6/25/22 07:43 36.0 80 16 119/54 98 Nasal Cannula 2.00 


 


6/25/22 07:00  66 14 133/64 96 Nasal Cannula 2.00 


 


6/25/22 07:00  66      


 


6/25/22 06:00  68 16 117/55 98 Nasal Cannula 2.00 


 


6/25/22 05:00  67 13 114/48 100 Nasal Cannula 2.00 


 


6/25/22 04:00     99 Nasal Cannula 2.00 


 


6/25/22 04:00  65 13 101/46 100 Nasal Cannula 2.00 


 


6/25/22 03:00  67 12 104/46 100 Nasal Cannula 2.00 


 


6/25/22 02:00  68 15 102/41 99 Nasal Cannula 2.00 


 


6/25/22 01:00  68 15 99/42 100 Nasal Cannula 2.00 


 


6/25/22 01:00  70      


 


6/25/22 00:00  75 15 101/45 100 Nasal Cannula 2.00 


 


6/25/22 00:00 37.0       


 


6/24/22 23:45     99 Nasal Cannula 2.00 


 


6/24/22 23:00  77 17 105/48 99 Nasal Cannula 2.00 


 


6/24/22 22:00  99 16 126/60 98 Nasal Cannula 2.00 


 


6/24/22 21:00  71 13 135/55 97 Nasal Cannula 2.00 


 


6/24/22 20:40  73      


 


6/24/22 20:00  72 16 109/48 100 Nasal Cannula 2.00 


 


6/24/22 20:00     99 Nasal Cannula 2.00 


 


6/24/22 20:00 36.8       


 


6/24/22 19:00  84 21 120/61 99 Nasal Cannula 2.00 


 


6/24/22 18:00  81 10 135/66 95 Nasal Cannula 2.00 


 


6/24/22 17:00  68 14 124/67 99 Nasal Cannula 2.00 


 


6/24/22 16:03     99 Nasal Cannula 2.00 


 


6/24/22 16:00  70 16 117/55 99 Nasal Cannula 2.00 


 


6/24/22 15:00  84 17 128/57 98 Nasal Cannula 2.00 


 


6/24/22 14:00  91 20 121/62 100 Nasal Cannula 2.00 


 


6/24/22 13:00  60 14 123/57 100 Nasal Cannula 2.00 


 


6/24/22 12:58  63      


 


6/24/22 12:00  65 20 123/55 100 Nasal Cannula 2.00 


 


6/24/22 12:00     99 Nasal Cannula 2.00 


 


6/24/22 11:40 36.7       














I & O 


 


 6/25/22





 07:00


 


Intake Total 1600 ml


 


Output Total 2200 ml


 


Balance -600 ml








Height & Weight


Height: 5'1.00"


Weight: 225lbs. 0oz. 102.289394hq; 33.92 BMI


Method:Estimated


General Appearance:  No Apparent Distress, Obese


HEENT:  PERRL/EOMI, TMs Normal, Normal ENT Inspection, Pharynx Normal, Moist 

Mucous Membranes


Neck:  Normal Inspection, Supple


Respiratory:  Lungs Clear, No Respiratory Distress


Cardiovascular:  Regular Rate, Rhythm, No Murmur


Capillary Refill:  Less Than 3 Seconds


Gastrointestinal:  normal bowel sounds, non tender, soft


Extremity:  Non Tender, Pedal Edema


Neurologic/Psychiatric:  Alert, Normal Mood/Affect


Skin:  Other (lower extremity wounds)


Lymphatic:  No Adenopathy





Results


Lab


Laboratory Tests


6/23/22 11:45








6/24/22 03:48








6/25/22 03:29

















TONY HATCH MD                Jun 25, 2022 11:05

## 2022-06-25 NOTE — PROGRESS NOTE - HOSPITALIST
Subjective


HPI/CC On Admission


Date Seen by Provider:  Jun 25, 2022


Time Seen by Provider:  09:40


Antonella Kuo is a 77 year old female with PMH HTN, HLD, T2DM, GERD, CAD, HFrEF, 

AFib, dementia, who presented from [facility] with shortness of breath. 

She is a poor historian due to her dementia. She was found to be in AFib with R

VR. She has a history of AFib but is not on anticoagulation. She does take 

Plavix. She follows with Dr. Bustillo. She has some foot ulcers for which she is 

currently on Augmentin.


Subjective/Events-last exam


She is doing well. She denies pain. She denies shortness of breath.





Focused Exam


Lactate Level


6/23/22 13:43: Lactic Acid Level 2.57*H


6/23/22 16:06: Lactic Acid Level 2.56*H


6/23/22 18:18: Lactic Acid Level 1.39








Objective


Exam


Vital Signs





Vital Signs








  Date Time  Temp Pulse Resp B/P (MAP) Pulse Ox O2 Delivery O2 Flow Rate FiO2


 


6/25/22 16:30 36.4 75 18 159/70 99 Nasal Cannula 2.00 





Capillary Refill : Less Than 3 Seconds


General Appearance:  No Apparent Distress, Obese


Respiratory:  Lungs Clear, No Respiratory Distress


Cardiovascular:  No Murmur, Irregularly Irregular


Gastrointestinal:  Normal Bowel Sounds, Soft


Neurologic/Psychiatric:  Alert, Disoriented





Results/Procedures


Lab


Laboratory Tests


6/25/22 03:29








Patient resulted labs reviewed.


Imaging:  Reviewed Imaging Films, Reviewed Imaging Report





Assessment/Plan


Assessment and Plan


Assess & Plan/Chief Complaint


Paroxysmal atrial fibrillation


Chronic HFpEF


   Cardiology following


   Echo with EF 60%, grade II diastolic dysfunction


   No rate or rhythm controlling medications


   No anticoagulation


   Plan for return home Monday





Bilateral foot wounds


   Continue Augmentin


   Wound care following





Dementia


Obesity


   Clinically significant, no acute management needs





HTN


HLD


CAD


T2DM


GERD


   Continue home meds





DVT prophylaxis: Lovenox





Lactic acidosis, resolved


AFib with RVR, resolved


Shock, resolved


Acute on chronic HFpEF, resolved





Diagnosis/Problems


Diagnosis/Problems





(1) Shock


Status:  Resolved


Resolution Date/Time:  6/25/22 @ 20:33


(2) Atrial fibrillation with rapid ventricular response


Status:  Resolved


Resolution Date/Time:  6/25/22 @ 20:33


(3) Acute on chronic respiratory failure with hypoxemia


Status:  Acute


(4) HFrEF (heart failure with reduced ejection fraction)


Status:  Chronic


(5) CAD (coronary artery disease)


Status:  Chronic


(6) T2DM (type 2 diabetes mellitus)


Status:  Chronic


(7) HTN (hypertension)


Status:  Chronic


(8) HLD (hyperlipidemia)


Status:  Chronic


(9) GERD (gastroesophageal reflux disease)


Status:  Chronic


(10) Obesity


Status:  Chronic


(11) Wound of foot


Status:  Chronic











DAVID PAYNE MD              Jun 25, 2022 20:33

## 2022-06-26 LAB
BUN/CREAT SERPL: 21
CALCIUM SERPL-MCNC: 8.7 MG/DL (ref 8.5–10.1)
CHLORIDE SERPL-SCNC: 103 MMOL/L (ref 98–107)
CO2 SERPL-SCNC: 30 MMOL/L (ref 21–32)
CREAT SERPL-MCNC: 0.66 MG/DL (ref 0.6–1.3)
GFR SERPLBLD BASED ON 1.73 SQ M-ARVRAT: 91 ML/MIN
GLUCOSE SERPL-MCNC: 112 MG/DL (ref 70–105)
MAGNESIUM SERPL-MCNC: 1.6 MG/DL (ref 1.6–2.4)
POTASSIUM SERPL-SCNC: 4.4 MMOL/L (ref 3.6–5)
SODIUM SERPL-SCNC: 141 MMOL/L (ref 135–145)

## 2022-06-26 RX ADMIN — COLLAGENASE SANTYL SCH GM: 250 OINTMENT TOPICAL at 21:27

## 2022-06-26 RX ADMIN — POTASSIUM CHLORIDE SCH MLS/HR: 200 INJECTION, SOLUTION INTRAVENOUS at 07:22

## 2022-06-26 RX ADMIN — MEMANTINE HYDROCHLORIDE SCH MG: 5 TABLET ORAL at 21:26

## 2022-06-26 RX ADMIN — GABAPENTIN SCH MG: 300 CAPSULE ORAL at 21:27

## 2022-06-26 RX ADMIN — ASPIRIN SCH MG: 81 TABLET ORAL at 08:19

## 2022-06-26 RX ADMIN — BUSPIRONE HYDROCHLORIDE SCH MG: 5 TABLET ORAL at 08:19

## 2022-06-26 RX ADMIN — PANTOPRAZOLE SCH MG: 20 TABLET, DELAYED RELEASE ORAL at 08:19

## 2022-06-26 RX ADMIN — SODIUM CHLORIDE SCH MLS/HR: 900 INJECTION, SOLUTION INTRAVENOUS at 16:01

## 2022-06-26 RX ADMIN — COLLAGENASE SANTYL SCH GM: 250 OINTMENT TOPICAL at 08:54

## 2022-06-26 RX ADMIN — MORPHINE SULFATE SCH MG: 30 TABLET, EXTENDED RELEASE ORAL at 21:26

## 2022-06-26 RX ADMIN — AMOXICILLIN AND CLAVULANATE POTASSIUM SCH MG: 875; 125 TABLET, FILM COATED ORAL at 17:18

## 2022-06-26 RX ADMIN — SODIUM CHLORIDE SCH MLS/HR: 900 INJECTION, SOLUTION INTRAVENOUS at 18:35

## 2022-06-26 RX ADMIN — INSULIN ASPART SCH UNIT: 100 INJECTION, SOLUTION INTRAVENOUS; SUBCUTANEOUS at 06:07

## 2022-06-26 RX ADMIN — MAGNESIUM SULFATE IN DEXTROSE SCH MLS/HR: 10 INJECTION, SOLUTION INTRAVENOUS at 07:22

## 2022-06-26 RX ADMIN — Medication SCH MLS/HR: at 16:01

## 2022-06-26 RX ADMIN — GABAPENTIN SCH MG: 100 CAPSULE ORAL at 16:01

## 2022-06-26 RX ADMIN — POTASSIUM CHLORIDE SCH MEQ: 1500 TABLET, EXTENDED RELEASE ORAL at 07:23

## 2022-06-26 RX ADMIN — INSULIN ASPART SCH UNIT: 100 INJECTION, SOLUTION INTRAVENOUS; SUBCUTANEOUS at 21:11

## 2022-06-26 RX ADMIN — SODIUM CHLORIDE SCH MLS/HR: 900 INJECTION, SOLUTION INTRAVENOUS at 06:48

## 2022-06-26 RX ADMIN — GABAPENTIN SCH MG: 100 CAPSULE ORAL at 08:54

## 2022-06-26 RX ADMIN — ALUMINUM HYDROXIDE, MAGNESIUM HYDROXIDE, AND DIMETHICONE PRN ML: 400; 400; 40 SUSPENSION ORAL at 00:19

## 2022-06-26 RX ADMIN — INSULIN ASPART SCH UNIT: 100 INJECTION, SOLUTION INTRAVENOUS; SUBCUTANEOUS at 11:23

## 2022-06-26 RX ADMIN — CLOPIDOGREL BISULFATE SCH MG: 75 TABLET, FILM COATED ORAL at 08:19

## 2022-06-26 RX ADMIN — MAGNESIUM SULFATE IN DEXTROSE SCH MLS/HR: 10 INJECTION, SOLUTION INTRAVENOUS at 08:54

## 2022-06-26 RX ADMIN — INSULIN ASPART SCH UNIT: 100 INJECTION, SOLUTION INTRAVENOUS; SUBCUTANEOUS at 15:34

## 2022-06-26 RX ADMIN — AMOXICILLIN AND CLAVULANATE POTASSIUM SCH MG: 875; 125 TABLET, FILM COATED ORAL at 08:19

## 2022-06-26 RX ADMIN — MAGNESIUM SULFATE IN DEXTROSE SCH MLS/HR: 10 INJECTION, SOLUTION INTRAVENOUS at 08:17

## 2022-06-26 RX ADMIN — BUSPIRONE HYDROCHLORIDE SCH MG: 5 TABLET ORAL at 16:01

## 2022-06-26 RX ADMIN — ENOXAPARIN SODIUM SCH MG: 100 INJECTION SUBCUTANEOUS at 16:01

## 2022-06-26 RX ADMIN — MORPHINE SULFATE SCH MG: 30 TABLET, EXTENDED RELEASE ORAL at 08:19

## 2022-06-26 RX ADMIN — PAROXETINE HYDROCHLORIDE SCH MG: 20 TABLET, FILM COATED ORAL at 08:20

## 2022-06-26 RX ADMIN — MEMANTINE HYDROCHLORIDE SCH MG: 5 TABLET ORAL at 08:19

## 2022-06-26 NOTE — PROGRESS NOTE - HOSPITALIST
Subjective


HPI/CC On Admission


Date Seen by Provider:  Jun 26, 2022


Time Seen by Provider:  10:45


Antonella Kuo is a 77 year old female with PMH HTN, HLD, T2DM, GERD, CAD, HFrEF, 

AFib, dementia, who presented from [facility] with shortness of breath. 

She is a poor historian due to her dementia. She was found to be in AFib with R

VR. She has a history of AFib but is not on anticoagulation. She does take 

Plavix. She follows with Dr. Bustillo. She has some foot ulcers for which she is 

currently on Augmentin.


Subjective/Events-last exam


She is in bed. She denies chest pain. She denies shortness of breath. She is 

about to eat lunch.





Focused Exam


Lactate Level


6/23/22 18:18: Lactic Acid Level 1.39








Objective


Exam


Vital Signs





Vital Signs








  Date Time  Temp Pulse Resp B/P (MAP) Pulse Ox O2 Delivery O2 Flow Rate FiO2


 


6/26/22 15:42 36.1 69 18 178/64 100 Nasal Cannula 2.00 





Capillary Refill : Less Than 3 Seconds


General Appearance:  No Apparent Distress, Obese


Respiratory:  Lungs Clear, No Respiratory Distress


Cardiovascular:  Regular Rate, Rhythm, No Murmur


Gastrointestinal:  Normal Bowel Sounds, Soft


Extremity:  Normal Inspection, No Pedal Edema


Neurologic/Psychiatric:  Alert





Results/Procedures


Lab


Laboratory Tests


6/26/22 06:17








Patient resulted labs reviewed.


Imaging:  Reviewed Imaging Films, Reviewed Imaging Report





Assessment/Plan


Assessment and Plan


Assess & Plan/Chief Complaint


Paroxysmal atrial fibrillation


Chronic HFpEF


   Cardiology following


   Echo with EF 60%, grade II diastolic dysfunction


   No rate or rhythm controlling medications


   No anticoagulation


   Likely discharge home tomorrow





Bilateral foot wounds


   Continue Augmentin


   Wound care following





Dementia


Obesity


   Clinically significant, no acute management needs





HTN


HLD


CAD


T2DM


GERD


   Continue home meds





DVT prophylaxis: Lovenox





Lactic acidosis, resolved


AFib with RVR, resolved


Shock, resolved


Acute on chronic HFpEF, resolved





Diagnosis/Problems


Diagnosis/Problems





(1) Shock


Status:  Resolved


Resolution Date/Time:  6/25/22 @ 20:33


(2) Atrial fibrillation with rapid ventricular response


Status:  Resolved


Resolution Date/Time:  6/25/22 @ 20:33


(3) Acute on chronic respiratory failure with hypoxemia


Status:  Acute


(4) HFrEF (heart failure with reduced ejection fraction)


Status:  Chronic


(5) CAD (coronary artery disease)


Status:  Chronic


(6) T2DM (type 2 diabetes mellitus)


Status:  Chronic


(7) HTN (hypertension)


Status:  Chronic


(8) HLD (hyperlipidemia)


Status:  Chronic


(9) GERD (gastroesophageal reflux disease)


Status:  Chronic


(10) Obesity


Status:  Chronic


(11) Wound of foot


Status:  Chronic











DAVID PAYNE MD              Jun 26, 2022 16:16

## 2022-06-26 NOTE — CARDIOLOGY PROGRESS NOTE
Subjective


Date Seen by Provider:  Jun 26, 2022


Time Seen by Provider:  11:42


Subjective/Events-last exam


Patient is laying down in bed, no new complaint.  No chest pain


Review of Systems


General:  No Chills, No Night Sweats; Fatigue, Malaise; No Appetite, No Other


HEENT:  No Head Aches, No Visual Changes, No Eye Pain, No Ear Pain, No 

Dysphasia, No Sinus Congestion, No Post Nasal Drip, No Sore Throat, No Other


Pulmonary:  No Dyspnea, No Cough, No Pleuritic Chest Pain, No Other


Cardiovascular:  No: Chest Pain, Palpitations, Orthopnea, Paroxysmal Noc. 

Dyspnea, Edema, Lt Headedness, Other





Focused Exam


Lactate Level


6/23/22 13:43: Lactic Acid Level 2.57*H


6/23/22 16:06: Lactic Acid Level 2.56*H


6/23/22 18:18: Lactic Acid Level 1.39








Objective-Cardiology


Exam


Last Set of Vital Signs





Vital Signs








 6/26/22 6/26/22





 07:53 10:16


 


Temp 36.4 


 


Pulse 68 


 


Resp 18 


 


B/P (MAP) 164/88 


 


Pulse Ox 100 


 


O2 Delivery  Nasal Cannula


 


O2 Flow Rate  2.00








I&O











Intake and Output 


 


 6/26/22





 00:00


 


Intake Total 840 ml


 


Output Total 1075 ml


 


Balance -235 ml


 


 


 


Intake Oral 840 ml


 


Output Urine Total 1075 ml


 


# Bowel Movements 1








General:  Alert, Cooperative, No Acute Distress


HEENT:  Atraumatic, PERRLA


Neck:  Supple, No JVD, No Thyromegaly


Lungs:  Normal Air Movement, Other (Bilateral rhonchi)


Heart:  Regular Rate, Normal S1, Normal S2, No Murmurs


Abdomen:  Normal Bowel Sounds, Soft, No Tenderness, No Hepatosplenomegaly, No 

Masses


Extremities:  No Clubbing, No Cyanosis, No Edema, Normal Pulses, No 

Tenderness/Swelling


Skin:  No Rashes, No Breakdown, No Significant Lesion


Neuro:  Normal Gait, Normal Speech, Strength at 5/5 X4 Ext, Normal Tone, 

Sensation Intact


Psych/Mental Status:  Mental Status NL, Mood NL





Results


Lab


Laboratory Tests


6/26/22 06:17














A/P-Cardiology


Admission Diagnosis


Atrial fibrillation


Hypotensive shock


Coronary artery disease


Congestive heart failure, acute on chronic left ventricular diastolic 

dysfunction





Assessment/Plan


Paroxysmal atrial fibrillation with rapid ventricular response


Converted back to sinus rhythm.  Doing well at this time.


She was treated with Cardizem bolus and a drip then it was discontinued due to 

bradycardia and hypotension.


Currently heart rate and blood pressure are stable.  Continue to monitor





Status post sepsis with septic shock, patient was on dopamine drip


Blood pressure is better, she is clinically more stable.  Continue to monitor





Status post hypotension secondary to sepsis and UTI, better at this time, 

receiving antibiotic





Coronary artery disease, history of multiple interventions in the past.


Cardiac catheterization done in 2012 by Dr. Gonsalez showing 90% in-stent 

restenosis in the LAD with successful balloon angioplasty.  She has 50% proximal

LAD stenosis and 50% proximal right coronary artery stenosis treated 

conservatively


Patient has moved back to our area recently.  Does not follow-up with a 

cardiologist.





Hypertension, better control at this point.  Continue to monitor





Shortness of breath, congestive heart failure, acute left ventricular diastolic 

dysfunction, given diuretics and monitor tolerance and response, currently I am 

giving her IV fluid and evaluate her response





History of bradycardia seen by Dr. Dominguez in the past.  Currently normal heart 

rate.





Diabetes mellitus, followed by primary care physician





Advanced dementia





Bilateral foot ulcers.  Wound care consults











CHARBEL GONSALEZ MD              Jun 26, 2022 11:43

## 2022-06-27 VITALS — SYSTOLIC BLOOD PRESSURE: 124 MMHG | DIASTOLIC BLOOD PRESSURE: 78 MMHG

## 2022-06-27 VITALS — SYSTOLIC BLOOD PRESSURE: 161 MMHG | DIASTOLIC BLOOD PRESSURE: 69 MMHG

## 2022-06-27 VITALS — SYSTOLIC BLOOD PRESSURE: 136 MMHG | DIASTOLIC BLOOD PRESSURE: 77 MMHG

## 2022-06-27 VITALS — DIASTOLIC BLOOD PRESSURE: 101 MMHG | SYSTOLIC BLOOD PRESSURE: 198 MMHG

## 2022-06-27 LAB
ARTERIAL PATENCY WRIST A: POSITIVE
BASE EXCESS STD BLDA CALC-SCNC: -0.4 MMOL/L (ref -2.5–2.5)
BDY SITE: (no result)
BODY TEMPERATURE: 36.1
BUN/CREAT SERPL: 17
CALCIUM SERPL-MCNC: 8.6 MG/DL (ref 8.5–10.1)
CHLORIDE SERPL-SCNC: 103 MMOL/L (ref 98–107)
CO2 BLDA CALC-SCNC: 27 MMOL/L (ref 21–31)
CO2 SERPL-SCNC: 27 MMOL/L (ref 21–32)
CREAT SERPL-MCNC: 0.6 MG/DL (ref 0.6–1.3)
GFR SERPLBLD BASED ON 1.73 SQ M-ARVRAT: 93 ML/MIN
GLUCOSE SERPL-MCNC: 102 MG/DL (ref 70–105)
INHALED O2 FLOW RATE: (no result) L/MIN
MAGNESIUM SERPL-MCNC: 1.7 MG/DL (ref 1.6–2.4)
PCO2 BLDA: 52 MMHG (ref 35–45)
PH BLDA: 7.3 [PH] (ref 7.37–7.43)
PO2 BLDA: 70 MMHG (ref 79–93)
POTASSIUM SERPL-SCNC: 4 MMOL/L (ref 3.6–5)
SAO2 % BLDA FROM PO2: 94 % (ref 94–100)
SODIUM SERPL-SCNC: 140 MMOL/L (ref 135–145)
VENTILATION MODE VENT: NO

## 2022-06-27 RX ADMIN — GABAPENTIN SCH MG: 100 CAPSULE ORAL at 13:16

## 2022-06-27 RX ADMIN — AMOXICILLIN AND CLAVULANATE POTASSIUM SCH MG: 875; 125 TABLET, FILM COATED ORAL at 16:38

## 2022-06-27 RX ADMIN — POTASSIUM CHLORIDE SCH MLS/HR: 200 INJECTION, SOLUTION INTRAVENOUS at 08:12

## 2022-06-27 RX ADMIN — PANTOPRAZOLE SCH MG: 20 TABLET, DELAYED RELEASE ORAL at 08:43

## 2022-06-27 RX ADMIN — INSULIN ASPART SCH UNIT: 100 INJECTION, SOLUTION INTRAVENOUS; SUBCUTANEOUS at 11:18

## 2022-06-27 RX ADMIN — INSULIN ASPART SCH UNIT: 100 INJECTION, SOLUTION INTRAVENOUS; SUBCUTANEOUS at 16:38

## 2022-06-27 RX ADMIN — APIXABAN SCH MG: 5 TABLET, FILM COATED ORAL at 08:43

## 2022-06-27 RX ADMIN — MEMANTINE HYDROCHLORIDE SCH MG: 5 TABLET ORAL at 08:43

## 2022-06-27 RX ADMIN — INSULIN ASPART SCH UNIT: 100 INJECTION, SOLUTION INTRAVENOUS; SUBCUTANEOUS at 22:03

## 2022-06-27 RX ADMIN — MEMANTINE HYDROCHLORIDE SCH MG: 5 TABLET ORAL at 20:05

## 2022-06-27 RX ADMIN — MORPHINE SULFATE SCH MG: 30 TABLET, EXTENDED RELEASE ORAL at 20:06

## 2022-06-27 RX ADMIN — ASPIRIN SCH MG: 81 TABLET ORAL at 08:43

## 2022-06-27 RX ADMIN — MAGNESIUM SULFATE IN DEXTROSE SCH MLS/HR: 10 INJECTION, SOLUTION INTRAVENOUS at 08:12

## 2022-06-27 RX ADMIN — GABAPENTIN SCH MG: 300 CAPSULE ORAL at 20:05

## 2022-06-27 RX ADMIN — AMOXICILLIN AND CLAVULANATE POTASSIUM SCH MG: 875; 125 TABLET, FILM COATED ORAL at 08:43

## 2022-06-27 RX ADMIN — MORPHINE SULFATE SCH MG: 30 TABLET, EXTENDED RELEASE ORAL at 08:43

## 2022-06-27 RX ADMIN — CLOPIDOGREL BISULFATE SCH MG: 75 TABLET, FILM COATED ORAL at 08:43

## 2022-06-27 RX ADMIN — APIXABAN SCH MG: 5 TABLET, FILM COATED ORAL at 20:05

## 2022-06-27 RX ADMIN — COLLAGENASE SANTYL SCH GM: 250 OINTMENT TOPICAL at 08:44

## 2022-06-27 RX ADMIN — GABAPENTIN SCH MG: 100 CAPSULE ORAL at 08:48

## 2022-06-27 RX ADMIN — BUSPIRONE HYDROCHLORIDE SCH MG: 5 TABLET ORAL at 08:43

## 2022-06-27 RX ADMIN — SODIUM CHLORIDE SCH MLS/HR: 900 INJECTION, SOLUTION INTRAVENOUS at 05:00

## 2022-06-27 RX ADMIN — POTASSIUM CHLORIDE SCH MEQ: 1500 TABLET, EXTENDED RELEASE ORAL at 08:13

## 2022-06-27 RX ADMIN — PAROXETINE HYDROCHLORIDE SCH MG: 20 TABLET, FILM COATED ORAL at 08:43

## 2022-06-27 RX ADMIN — INSULIN ASPART SCH UNIT: 100 INJECTION, SOLUTION INTRAVENOUS; SUBCUTANEOUS at 05:43

## 2022-06-27 RX ADMIN — BUSPIRONE HYDROCHLORIDE SCH MG: 5 TABLET ORAL at 16:38

## 2022-06-27 RX ADMIN — COLLAGENASE SANTYL SCH GM: 250 OINTMENT TOPICAL at 20:11

## 2022-06-27 NOTE — DISCHARGE SUMMARY
Diagnosis/Chief Complaint


Date of Admission


Jun 23, 2022 at 15:15


Date of Discharge





Discharge Date:  Jun 27, 2022


Admission Diagnosis


AFib with RVR


Primary Care





Discharge Diagnosis





(1) Shock


Status:  Resolved


(2) Atrial fibrillation with rapid ventricular response


Status:  Resolved


(3) Acute on chronic respiratory failure with hypoxemia


Status:  Acute


(4) HFrEF (heart failure with reduced ejection fraction)


Status:  Chronic


(5) CAD (coronary artery disease)


Status:  Chronic


(6) T2DM (type 2 diabetes mellitus)


Status:  Chronic


(7) HTN (hypertension)


Status:  Chronic


(8) HLD (hyperlipidemia)


Status:  Chronic


(9) GERD (gastroesophageal reflux disease)


Status:  Chronic


(10) Obesity


Status:  Chronic


(11) Wound of foot


Status:  Chronic





Discharge Summary


Discharge Physical Exam


Allergies:  


Coded Allergies:  


     diazepam (Unverified  Adverse Reaction, Unknown, 8/10/19)


Vitals & I&Os





Vital Signs








  Date Time  Temp Pulse Resp B/P (MAP) Pulse Ox O2 Delivery O2 Flow Rate FiO2


 


6/27/22 11:39 36.6 87 18 161/69 (99) 93 Room Air  


 


6/27/22 08:04       2.00 








General Appearance:  No Apparent Distress, Chronically ill


Cardiovascular:  No Murmur, Irregularly Irregular


Gastrointestinal:  Normal Bowel Sounds, Soft


Neurologic/Psychiatric:  Alert, Disoriented





Hospital Course


Patient was admitted to the hospital secondary to atrial fibrillation with rapid

ventricular rate.  She was treated with IV Cardizem and did well.  Her rate 

improved and she was transferred to the fourth floor where she did well.  He was

started on anticoagulation by Dr. Gonsalez.  She is to follow-up with Dr. Bustillo and 

her cardiologist to follow-up this hospital stay.


Labs (last 24 hrs)


Laboratory Tests


6/26/22 15:32: Glucometer 117H


6/26/22 20:09: Glucometer 127H


6/27/22 05:40: Glucometer 113H


6/27/22 06:57: 


Sodium Level 140, Potassium Level 4.0, Chloride Level 103, Carbon Dioxide Level 

27, Anion Gap 10, Blood Urea Nitrogen 10, Creatinine 0.60, Estimat Glomerular 

Filtration Rate 93, BUN/Creatinine Ratio 17, Glucose Level 102, Calcium Level 

8.6, Magnesium Level 1.7


6/27/22 10:22: Glucometer 170H





Microbiology


6/23/22 Urine Culture - Final, Complete


          NO GROWTH


6/23/22 Blood Culture - Preliminary, Resulted


          Staph, Coag Neg (CNS)


Patient resulted labs reviewed.


Pending Labs


Laboratory Tests


6/27/22 05:40: Glucometer 113


6/27/22 06:57: 


Sodium Level 140, Potassium Level 4.0, Chloride Level 103, Carbon Dioxide Level 

27, Anion Gap 10, Blood Urea Nitrogen 10, Creatinine 0.60, Estimat Glomerular 

Filtration Rate 93, BUN/Creatinine Ratio 17, Glucose Level 102, Calcium Level 

8.6, Magnesium Level 1.7


6/27/22 10:22: Glucometer 170





Imaging:  Reviewed Imaging Films, Reviewed Imaging Report





Discussion & Recommendations


Discharge Planning:  >30 minutes discharge planning





Discharge


Home Medications:





Active Scripts


Active


Amox Tr-K Clv 875-125 mg Tab (Amoxicillin/Potassium Clav) 875 Mg-125 Mg Tablet 

875 Mg PO BID WITH MEALS


Eliquis (Apixaban) 5 Mg Tablet 5 Mg PO BID


Levemir (Insulin Determir) 1,000 Units/10 Ml Soln 20 Units SQ DAILY


Reported


Tramadol HCl 50 Mg Tablet 50 Mg PO Q8H PRN


Neomycin-Polymyxin-Hc Ear Soln (Neomycin/Polymyxin B Sulf/Hc) 3.5 Mg/Ml-10,000 

Unit/Ml-1 % Solution 3 Drops EACH EAR UD PRN


Ondansetron HCl 4 Mg Tablet 4 Mg PO Q6H PRN


Furosemide 40 Mg Tablet 40 Mg PO DAILY PRN


Calmoseptine Ointment (Menthol/Lanolin/Calamine/Znox) 0.44 %-20.6 % Oint 1 Appl

ic TP BID


     APPLY TO BUTTOCKS


Miralax (Polyethylene Glycol 3350) 17 Gram Powd.pack 17 Gm PO DAILY


Nystatin 100,000 Unit/Gram Powder 1 Applic TOP 0900,1400,1900


     APPLY TO ABDOMINAL FOLDS


Memantine HCl 5 Mg Tablet 5 Mg PO Q12H


Buspirone HCl 5 Mg Tablet 5 Mg PO 0800,1700


Omeprazole 20 Mg Capsule.dr 20 Mg PO DAILY


Multivitamin 1 Each Tablet 1 Each PO DAILY


Atorvastatin Calcium 40 Mg Tablet 40 Mg PO HS


Neurontin (Gabapentin) 300 Mg Capsule 300 Mg PO HS


Morphine Sulfate ER (Morphine Sulfate) 30 Mg Tablet.er 30 Mg PO BID


Aspirin EC (Aspirin) 81 Mg Tablet.dr 81 Mg PO DAILY


Acetaminophen 325 Mg Tablet 650 Mg PO TID


Milk of Magnesia (Magnesium Hydroxide) 400 Mg/5 Ml Oral.susp 30 Ml PO DAILY PRN


Klor-Con 10 (Potassium Chloride) 10 Meq Tablet.er 10 Meq PO DAILY


Gabapentin 100 Mg Capsule 100 Mg PO 0800,1400


Colace (Docusate Sodium) 100 Mg Capsule 100 Mg PO 0800,1700


Clopidogrel (Clopidogrel Bisulfate) 75 Mg Tablet 75 Mg PO DAILY


Paroxetine HCl 30 Mg Tablet 30 Mg PO DAILY


Furosemide 40 Mg Tablet 40 Mg PO DAILY





Instructions to patient/family


Please see electronic discharge instructions given to patient.











MENA NAVARRETE MD              Jun 27, 2022 13:02

## 2022-06-27 NOTE — DISCHARGE INST-SIMPLE/STANDARD
Discharge Inst-Standard


Discharge Medications


New, Converted or Re-Newed RX:  Transmitted to Pharmacy





Patient Instructions/Follow Up


Plan of Care/Instructions/FU:  


Please continue take your medications as written.  Please follow-up with


your primary care doctor to follow-up this hospital stay.


Activity as Tolerated:  Yes


Discharge Diet:  Cardiac Diet


Return to The Hospital For:  


Chest pain, rapid heart rate, shortness of breath, fever, weakness, if you


feel you are getting worse.











MENA NAVARRETE MD              Jun 27, 2022 12:19

## 2022-06-27 NOTE — DIAGNOSTIC IMAGING REPORT
EXAMINATION: Chest 1 view



HISTORY: Shortness of breath



COMPARISON: 06/23/2022



FINDINGS: 



Heart size and pulmonary vasculature are normal. Mild

interstitial opacities within the mid and lower lungs. No pleural

effusion or pneumothorax. Degenerative changes of the thoracic

spine. Osseous structures are otherwise intact.



IMPRESSION: 



1. Interstitial opacities throughout the lungs concerning for

pulmonary edema or atypical infection.



Dictated by: 



  Dictated on workstation # [id number]

## 2022-06-27 NOTE — CARDIOLOGY PROGRESS NOTE
Subjective


Date Seen by Provider:  Jun 27, 2022


Time Seen by Provider:  09:17


Subjective/Events-last exam


Patient is laying down in bed, feeling better.  No new complaint.


Review of Systems


General:  No Chills, No Night Sweats, No Fatigue, No Malaise, No Appetite, No 

Other


HEENT:  No Head Aches, No Visual Changes, No Eye Pain, No Ear Pain, No 

Dysphasia, No Sinus Congestion, No Post Nasal Drip, No Sore Throat, No Other


Pulmonary:  No Dyspnea, No Cough, No Pleuritic Chest Pain, No Other


Cardiovascular:  No: Chest Pain, Palpitations, Orthopnea, Paroxysmal Noc. 

Dyspnea, Edema, Lt Headedness, Other





Objective-Cardiology


Exam


Last Set of Vital Signs





Vital Signs








 6/27/22 6/27/22





 08:00 08:04


 


Temp 35.8 


 


Pulse 62 


 


Resp 18 


 


B/P (MAP) 118/57 


 


Pulse Ox 95 


 


O2 Delivery  Nasal Cannula


 


O2 Flow Rate  2.00








I&O











Intake and Output 


 


 6/27/22





 00:00


 


Intake Total 1880 ml


 


Output Total 1200 ml


 


Balance 680 ml


 


 


 


Intake Oral 1680 ml


 


IV Total 200 ml


 


Output Urine Total 1200 ml








General:  Alert, Oriented X3, Cooperative, No Acute Distress


HEENT:  Atraumatic, PERRLA


Neck:  Supple, No JVD, No Thyromegaly


Lungs:  Normal Air Movement, Other (Bilateral rhonchi)


Heart:  Regular Rate, Normal S1, Normal S2, No Murmurs


Abdomen:  Normal Bowel Sounds, Soft, No Tenderness, No Hepatosplenomegaly, No 

Masses


Extremities:  No Clubbing, No Cyanosis, No Edema, Normal Pulses, No 

Tenderness/Swelling


Skin:  No Rashes, No Breakdown, No Significant Lesion


Neuro:  Normal Gait, Normal Speech, Strength at 5/5 X4 Ext, Normal Tone, 

Sensation Intact


Psych/Mental Status:  Mental Status NL, Mood NL





Results


Lab


Laboratory Tests


6/27/22 06:57














A/P-Cardiology


Admission Diagnosis


Atrial fibrillation


Hypotensive shock


Coronary artery disease


Congestive heart failure, acute on chronic left ventricular diastolic 

dysfunction





Assessment/Plan


Paroxysmal atrial fibrillation with rapid ventricular response


Converted back to sinus rhythm.  Doing well at this time.


She was treated with Cardizem bolus and a drip then it was discontinued due to 

bradycardia and hypotension.


Currently heart rate and blood pressure are stable.  Continue to monitor





Status post sepsis with septic shock, patient was on dopamine drip


Blood pressure is better, she is clinically more stable.  Continue to monitor





Status post hypotension secondary to sepsis and UTI, better at this time, 

receiving antibiotic





Coronary artery disease, history of multiple interventions in the past.


Cardiac catheterization done in 2012 by Dr. Gonsalez showing 90% in-stent 

restenosis in the LAD with successful balloon angioplasty.  She has 50% proximal

LAD stenosis and 50% proximal right coronary artery stenosis treated 

conservatively


Patient has moved back to our area recently.  Does not follow-up with a 

cardiologist.


I will continue monitoring





Hypertension, better control at this point.  Continue to monitor





Shortness of breath, congestive heart failure, acute left ventricular diastolic 

dysfunction, given diuretics and monitor tolerance and response, currently I am 

giving her IV fluid and evaluate her response





History of bradycardia seen by Dr. Dominguez in the past.  Currently normal heart 

rate.





Diabetes mellitus, followed by primary care physician





Advanced dementia





Bilateral foot ulcers.  Wound care consults











CHARBEL GONSALEZ MD              Jun 27, 2022 09:18

## 2022-06-27 NOTE — PROGRESS NOTE - HOSPITALIST
Progress Note Addendum


Progress Notes/Assess & Plan


Date Seen


6/27/22


Time Seen by Provider:  15:45


Diagonsis/Assessment & Plan


Called to room due to respiratory distress.





Patient visibly short of breath and tachypneic. Somewhat diaphoretic. Wheezing 

on exam. Aide reports she was hypoxic in the 80s on his arrival and he placed 

oxygen and notify RN who called me. RT to bedside as well. CXR, ABG, and EKG 

ordered. Discussed with family outside of room. Reviewed CXR on machine and 

appears to have some pulm edema. Lasix ordered. Reviewed Echo which shows grade 

2 diastolic dysfunction. Likely flash pulm edema.





Critical Care


Critically Ill Patient





   Critical Care Start Date:  Jun 27, 2022


   Critical Care Start Time:  15:45


   Stop Time:  16:07











MENA NAVARRETE MD              Jun 27, 2022 16:05

## 2022-06-28 VITALS — DIASTOLIC BLOOD PRESSURE: 83 MMHG | SYSTOLIC BLOOD PRESSURE: 134 MMHG

## 2022-06-28 VITALS — DIASTOLIC BLOOD PRESSURE: 77 MMHG | SYSTOLIC BLOOD PRESSURE: 161 MMHG

## 2022-06-28 VITALS — DIASTOLIC BLOOD PRESSURE: 68 MMHG | SYSTOLIC BLOOD PRESSURE: 121 MMHG

## 2022-06-28 VITALS — DIASTOLIC BLOOD PRESSURE: 56 MMHG | SYSTOLIC BLOOD PRESSURE: 124 MMHG

## 2022-06-28 VITALS — SYSTOLIC BLOOD PRESSURE: 158 MMHG | DIASTOLIC BLOOD PRESSURE: 75 MMHG

## 2022-06-28 VITALS — DIASTOLIC BLOOD PRESSURE: 66 MMHG | SYSTOLIC BLOOD PRESSURE: 135 MMHG

## 2022-06-28 LAB
BUN/CREAT SERPL: 18
CALCIUM SERPL-MCNC: 9.1 MG/DL (ref 8.5–10.1)
CHLORIDE SERPL-SCNC: 104 MMOL/L (ref 98–107)
CO2 SERPL-SCNC: 24 MMOL/L (ref 21–32)
CREAT SERPL-MCNC: 0.73 MG/DL (ref 0.6–1.3)
GFR SERPLBLD BASED ON 1.73 SQ M-ARVRAT: 85 ML/MIN
GLUCOSE SERPL-MCNC: 123 MG/DL (ref 70–105)
MAGNESIUM SERPL-MCNC: 1.7 MG/DL (ref 1.6–2.4)
POTASSIUM SERPL-SCNC: 4 MMOL/L (ref 3.6–5)
SODIUM SERPL-SCNC: 139 MMOL/L (ref 135–145)

## 2022-06-28 RX ADMIN — MORPHINE SULFATE SCH MG: 30 TABLET, EXTENDED RELEASE ORAL at 20:59

## 2022-06-28 RX ADMIN — GABAPENTIN SCH MG: 100 CAPSULE ORAL at 13:15

## 2022-06-28 RX ADMIN — INSULIN ASPART SCH UNIT: 100 INJECTION, SOLUTION INTRAVENOUS; SUBCUTANEOUS at 05:58

## 2022-06-28 RX ADMIN — APIXABAN SCH MG: 5 TABLET, FILM COATED ORAL at 20:59

## 2022-06-28 RX ADMIN — MEMANTINE HYDROCHLORIDE SCH MG: 5 TABLET ORAL at 20:59

## 2022-06-28 RX ADMIN — ACETAMINOPHEN PRN MG: 325 TABLET ORAL at 16:59

## 2022-06-28 RX ADMIN — GABAPENTIN SCH MG: 100 CAPSULE ORAL at 09:15

## 2022-06-28 RX ADMIN — MORPHINE SULFATE SCH MG: 30 TABLET, EXTENDED RELEASE ORAL at 09:12

## 2022-06-28 RX ADMIN — MAGNESIUM SULFATE IN DEXTROSE SCH MLS/HR: 10 INJECTION, SOLUTION INTRAVENOUS at 06:54

## 2022-06-28 RX ADMIN — POTASSIUM CHLORIDE SCH MEQ: 750 TABLET, FILM COATED, EXTENDED RELEASE ORAL at 06:54

## 2022-06-28 RX ADMIN — ASPIRIN SCH MG: 81 TABLET ORAL at 09:12

## 2022-06-28 RX ADMIN — POTASSIUM CHLORIDE SCH MLS/HR: 200 INJECTION, SOLUTION INTRAVENOUS at 05:58

## 2022-06-28 RX ADMIN — INSULIN ASPART SCH UNIT: 100 INJECTION, SOLUTION INTRAVENOUS; SUBCUTANEOUS at 16:49

## 2022-06-28 RX ADMIN — MAGNESIUM SULFATE IN DEXTROSE SCH MLS/HR: 10 INJECTION, SOLUTION INTRAVENOUS at 06:00

## 2022-06-28 RX ADMIN — INSULIN ASPART SCH UNIT: 100 INJECTION, SOLUTION INTRAVENOUS; SUBCUTANEOUS at 20:49

## 2022-06-28 RX ADMIN — POTASSIUM CHLORIDE SCH MEQ: 1500 TABLET, EXTENDED RELEASE ORAL at 05:58

## 2022-06-28 RX ADMIN — BUSPIRONE HYDROCHLORIDE SCH MG: 5 TABLET ORAL at 16:59

## 2022-06-28 RX ADMIN — PANTOPRAZOLE SCH MG: 20 TABLET, DELAYED RELEASE ORAL at 09:12

## 2022-06-28 RX ADMIN — APIXABAN SCH MG: 5 TABLET, FILM COATED ORAL at 09:11

## 2022-06-28 RX ADMIN — COLLAGENASE SANTYL SCH GM: 250 OINTMENT TOPICAL at 20:59

## 2022-06-28 RX ADMIN — COLLAGENASE SANTYL SCH GM: 250 OINTMENT TOPICAL at 09:12

## 2022-06-28 RX ADMIN — MEMANTINE HYDROCHLORIDE SCH MG: 5 TABLET ORAL at 09:11

## 2022-06-28 RX ADMIN — INSULIN ASPART SCH UNIT: 100 INJECTION, SOLUTION INTRAVENOUS; SUBCUTANEOUS at 11:28

## 2022-06-28 RX ADMIN — BUSPIRONE HYDROCHLORIDE SCH MG: 5 TABLET ORAL at 09:12

## 2022-06-28 RX ADMIN — MAGNESIUM SULFATE IN DEXTROSE SCH MLS/HR: 10 INJECTION, SOLUTION INTRAVENOUS at 06:25

## 2022-06-28 RX ADMIN — FUROSEMIDE SCH MG: 20 TABLET ORAL at 09:11

## 2022-06-28 RX ADMIN — CLOPIDOGREL BISULFATE SCH MG: 75 TABLET, FILM COATED ORAL at 09:12

## 2022-06-28 RX ADMIN — PAROXETINE HYDROCHLORIDE SCH MG: 20 TABLET, FILM COATED ORAL at 09:12

## 2022-06-28 RX ADMIN — GABAPENTIN SCH MG: 300 CAPSULE ORAL at 20:59

## 2022-06-28 RX ADMIN — AMOXICILLIN AND CLAVULANATE POTASSIUM SCH MG: 875; 125 TABLET, FILM COATED ORAL at 16:59

## 2022-06-28 RX ADMIN — AMOXICILLIN AND CLAVULANATE POTASSIUM SCH MG: 875; 125 TABLET, FILM COATED ORAL at 09:12

## 2022-06-28 RX ADMIN — ALUMINUM HYDROXIDE, MAGNESIUM HYDROXIDE, AND DIMETHICONE PRN ML: 400; 400; 40 SUSPENSION ORAL at 13:15

## 2022-06-28 NOTE — CARDIOLOGY PROGRESS NOTE
Subjective


Date Seen by Provider:  Jun 28, 2022


Time Seen by Provider:  08:37


Subjective/Events-last exam


Patient is lethargic.  Feeling better today


No chest pain or shortness of breath


Review of Systems


General:  No Chills, No Night Sweats; Fatigue, Malaise; No Appetite, No Other


HEENT:  No Head Aches, No Visual Changes, No Eye Pain, No Ear Pain, No 

Dysphasia, No Sinus Congestion, No Post Nasal Drip, No Sore Throat, No Other


Pulmonary:  Dyspnea; No Cough, No Pleuritic Chest Pain, No Other


Cardiovascular:  No: Chest Pain, Palpitations, Orthopnea, Paroxysmal Noc. 

Dyspnea, Edema, Lt Headedness, Other





Objective-Cardiology


Exam


Last Set of Vital Signs





Vital Signs








 6/28/22





 07:50


 


Temp 36.6


 


Pulse 73


 


Resp 16


 


B/P (MAP) 158/75 (102)


 


Pulse Ox 100


 


O2 Delivery Nasal Cannula


 


O2 Flow Rate 3.00








I&O











Intake and Output 


 


 6/28/22





 00:00


 


Intake Total 990 ml


 


Output Total 1475 ml


 


Balance -485 ml


 


 


 


Intake Oral 990 ml


 


Output Urine Total 1475 ml








General:  Alert, Oriented X3, Cooperative, No Acute Distress


HEENT:  Atraumatic, PERRLA


Neck:  Supple, No JVD, No Thyromegaly


Lungs:  Normal Air Movement, Other (Bilateral rhonchi)


Heart:  Regular Rate, Normal S1, Normal S2, No Murmurs


Abdomen:  Normal Bowel Sounds, Soft, No Tenderness, No Hepatosplenomegaly, No Ma

sses


Extremities:  No Clubbing, No Cyanosis, No Edema, Normal Pulses, No Tender

ness/Swelling


Skin:  No Rashes, No Breakdown, No Significant Lesion


Neuro:  Normal Gait, Normal Speech, Strength at 5/5 X4 Ext, Normal Tone, 

Sensation Intact


Psych/Mental Status:  Mental Status NL, Mood NL





Results


Lab


Laboratory Tests


6/28/22 05:30














A/P-Cardiology


Admission Diagnosis


Atrial fibrillation


Hypotensive shock


Coronary artery disease


Congestive heart failure, acute on chronic left ventricular diastolic 

dysfunction





Assessment/Plan


Paroxysmal atrial fibrillation with rapid ventricular response


Converted back to sinus rhythm.  Doing well at this time.


She was treated with Cardizem bolus and a drip then it was discontinued due to b

radycardia and hypotension.


Currently heart rate and blood pressure are stable.  Continue to monitor





Status post acute respiratory failure, significant shortness of breath,


Had congestive heart failure with acute left ventricular diastolic dysfunction, 

responded to aggressive diuresis


I will start her on oral Lasix 20 mg daily and potassium and monitor tolerance 

and response





Status post sepsis with septic shock, patient was on dopamine drip


Blood pressure is better, she is clinically more stable.  Continue to monitor





Status post hypotension secondary to sepsis and UTI, better at this time, 

receiving antibiotic





Coronary artery disease, history of multiple interventions in the past.


Cardiac catheterization done in 2012 by Dr. Gonsalez showing 90% in-stent 

restenosis in the LAD with successful balloon angioplasty.  She has 50% proximal

LAD stenosis and 50% proximal right coronary artery stenosis treated 

conservatively


Patient has moved back to our area recently.  Does not follow-up with a 

cardiologist.


I will continue monitoring





Hypertension, better control at this point.  Continue to monitor





History of bradycardia seen by Dr. Dominguez in the past.  Currently normal heart 

rate.





Diabetes mellitus, followed by primary care physician





Advanced dementia





Bilateral foot ulcers.  Wound care consults











CHARBEL GONSALEZ MD              Jun 28, 2022 08:41

## 2022-06-28 NOTE — PROGRESS NOTE - HOSPITALIST
Subjective


HPI/CC On Admission


Date Seen by Provider:  Jun 28, 2022


Antonella Kuo is a 77 year old female with PMH HTN, HLD, T2DM, GERD, CAD, HFrEF, 

AFib, dementia, who presented from [facility] with shortness of breath. 

She is a poor historian due to her dementia. She was found to be in AFib with 

RVR. She has a history of AFib but is not on anticoagulation. She does take 

Plavix. She follows with Dr. Bustillo. She has some foot ulcers for which she is 

currently on Augmentin.


Subjective/Events-last exam


Pt reports feeling much better.





Objective


Exam


Vital Signs





Vital Signs








  Date Time  Temp Pulse Resp B/P (MAP) Pulse Ox O2 Delivery O2 Flow Rate FiO2


 


6/28/22 12:40  82      


 


6/28/22 11:01 36.8  18 121/68 (85) 100 Nasal Cannula 3.00 





Capillary Refill : Less Than 3 Seconds


General Appearance:  No Apparent Distress, Chronically ill, Obese


Respiratory:  Lungs Clear, No Respiratory Distress


Cardiovascular:  Regular Rate, Rhythm, No Murmur


Neurologic/Psychiatric:  Alert, Oriented x3





Results/Procedures


Lab


Laboratory Tests


6/28/22 05:30








Patient resulted labs reviewed.


Imaging:  Reviewed Imaging Films, Reviewed Imaging Report





Assessment/Plan


Assessment and Plan


Assess & Plan/Chief Complaint


Paroxysmal atrial fibrillation


Chronic HFpEF


Flas pulm edema


   Cardiology following


   Echo with EF 60%, grade II diastolic dysfunction


   Episode of flash pulm edema yesterday


   Monitor on lasix maintenance dose





Bilateral foot wounds


   Continue Augmentin


   Wound care following





Dementia


Obesity


   Clinically significant, no acute management needs





HTN


HLD


CAD


T2DM


GERD


   Continue home meds





DVT prophylaxis: Lovenox





Lactic acidosis, resolved


AFib with RVR, resolved


Shock, resolved





Diagnosis/Problems


Diagnosis/Problems





(1) Shock


Status:  Resolved


Resolution Date/Time:  6/25/22 @ 20:33


(2) Atrial fibrillation with rapid ventricular response


Status:  Resolved


Resolution Date/Time:  6/25/22 @ 20:33


(3) Acute on chronic respiratory failure with hypoxemia


Status:  Acute


(4) HFrEF (heart failure with reduced ejection fraction)


Status:  Chronic


(5) CAD (coronary artery disease)


Status:  Chronic


(6) T2DM (type 2 diabetes mellitus)


Status:  Chronic


(7) HTN (hypertension)


Status:  Chronic


(8) HLD (hyperlipidemia)


Status:  Chronic


(9) GERD (gastroesophageal reflux disease)


Status:  Chronic


(10) Obesity


Status:  Chronic


(11) Wound of foot


Status:  Chronic











MENA NAVARRETE MD              Jun 28, 2022 13:11

## 2022-06-29 VITALS — DIASTOLIC BLOOD PRESSURE: 79 MMHG | SYSTOLIC BLOOD PRESSURE: 172 MMHG

## 2022-06-29 VITALS — DIASTOLIC BLOOD PRESSURE: 60 MMHG | SYSTOLIC BLOOD PRESSURE: 105 MMHG

## 2022-06-29 VITALS — SYSTOLIC BLOOD PRESSURE: 134 MMHG | DIASTOLIC BLOOD PRESSURE: 73 MMHG

## 2022-06-29 LAB
BUN/CREAT SERPL: 19
CALCIUM SERPL-MCNC: 9 MG/DL (ref 8.5–10.1)
CHLORIDE SERPL-SCNC: 103 MMOL/L (ref 98–107)
CO2 SERPL-SCNC: 30 MMOL/L (ref 21–32)
CREAT SERPL-MCNC: 0.64 MG/DL (ref 0.6–1.3)
GFR SERPLBLD BASED ON 1.73 SQ M-ARVRAT: 92 ML/MIN
GLUCOSE SERPL-MCNC: 100 MG/DL (ref 70–105)
MAGNESIUM SERPL-MCNC: 1.9 MG/DL (ref 1.6–2.4)
POTASSIUM SERPL-SCNC: 3.9 MMOL/L (ref 3.6–5)
SODIUM SERPL-SCNC: 140 MMOL/L (ref 135–145)

## 2022-06-29 RX ADMIN — POTASSIUM CHLORIDE SCH MEQ: 750 TABLET, FILM COATED, EXTENDED RELEASE ORAL at 06:19

## 2022-06-29 RX ADMIN — FUROSEMIDE SCH MG: 20 TABLET ORAL at 08:42

## 2022-06-29 RX ADMIN — APIXABAN SCH MG: 5 TABLET, FILM COATED ORAL at 08:42

## 2022-06-29 RX ADMIN — PAROXETINE HYDROCHLORIDE SCH MG: 20 TABLET, FILM COATED ORAL at 08:42

## 2022-06-29 RX ADMIN — CLOPIDOGREL BISULFATE SCH MG: 75 TABLET, FILM COATED ORAL at 08:42

## 2022-06-29 RX ADMIN — MEMANTINE HYDROCHLORIDE SCH MG: 5 TABLET ORAL at 08:42

## 2022-06-29 RX ADMIN — BUSPIRONE HYDROCHLORIDE SCH MG: 5 TABLET ORAL at 08:42

## 2022-06-29 RX ADMIN — POTASSIUM CHLORIDE SCH MEQ: 1500 TABLET, EXTENDED RELEASE ORAL at 06:38

## 2022-06-29 RX ADMIN — MORPHINE SULFATE SCH MG: 30 TABLET, EXTENDED RELEASE ORAL at 08:42

## 2022-06-29 RX ADMIN — POTASSIUM CHLORIDE SCH MLS/HR: 200 INJECTION, SOLUTION INTRAVENOUS at 06:38

## 2022-06-29 RX ADMIN — MAGNESIUM SULFATE IN DEXTROSE SCH MLS/HR: 10 INJECTION, SOLUTION INTRAVENOUS at 06:45

## 2022-06-29 RX ADMIN — GABAPENTIN SCH MG: 100 CAPSULE ORAL at 08:51

## 2022-06-29 RX ADMIN — COLLAGENASE SANTYL SCH GM: 250 OINTMENT TOPICAL at 08:43

## 2022-06-29 RX ADMIN — PANTOPRAZOLE SCH MG: 20 TABLET, DELAYED RELEASE ORAL at 08:42

## 2022-06-29 RX ADMIN — ASPIRIN SCH MG: 81 TABLET ORAL at 08:42

## 2022-06-29 RX ADMIN — INSULIN ASPART SCH UNIT: 100 INJECTION, SOLUTION INTRAVENOUS; SUBCUTANEOUS at 05:29

## 2022-06-29 RX ADMIN — INSULIN ASPART SCH UNIT: 100 INJECTION, SOLUTION INTRAVENOUS; SUBCUTANEOUS at 10:09

## 2022-06-29 NOTE — CARDIOLOGY PROGRESS NOTE
Subjective


Date Seen by Provider:  Jun 29, 2022


Time Seen by Provider:  10:14


Subjective/Events-last exam


Patient was seen at bedside, laying down comfortably, no new complaint.  

Reporting improvement in her symptoms


Review of Systems


General:  No Chills, No Night Sweats; Fatigue, Malaise; No Appetite, No Other


HEENT:  No Head Aches, No Visual Changes, No Eye Pain, No Ear Pain, No 

Dysphasia, No Sinus Congestion, No Post Nasal Drip, No Sore Throat, No Other


Pulmonary:  No Dyspnea, No Cough, No Pleuritic Chest Pain, No Other


Cardiovascular:  No: Chest Pain, Palpitations, Orthopnea, Paroxysmal Noc. 

Dyspnea, Edema, Lt Headedness, Other





Objective-Cardiology


Exam


Last Set of Vital Signs





Vital Signs








 6/29/22 6/29/22





 07:55 08:00


 


Temp 36.1 


 


Pulse 73 


 


Resp 18 


 


B/P (MAP) 172/79 (110) 


 


Pulse Ox 100 


 


O2 Delivery  Nasal Cannula


 


O2 Flow Rate  3.00








I&O











Intake and Output 


 


 6/29/22





 00:00


 


Intake Total 1500 ml


 


Output Total 575 ml


 


Balance 925 ml


 


 


 


Intake Oral 1300 ml


 


IV Total 200 ml


 


Output Urine Total 575 ml








General:  Alert, Oriented X3, Cooperative, No Acute Distress


HEENT:  Atraumatic, PERRLA


Neck:  Supple, No JVD, No Thyromegaly


Lungs:  Normal Air Movement, Other (Bilateral rhonchi)


Heart:  Regular Rate, Normal S1, Normal S2, No Murmurs


Abdomen:  Normal Bowel Sounds, Soft, No Tenderness, No Hepatosplenomegaly, No 

Masses


Extremities:  No Clubbing, No Cyanosis, No Edema, Normal Pulses, No 

Tenderness/Swelling


Skin:  No Rashes, No Breakdown, No Significant Lesion


Neuro:  Normal Gait, Normal Speech, Strength at 5/5 X4 Ext, Normal Tone, 

Sensation Intact


Psych/Mental Status:  Mental Status NL, Mood NL





Results


Lab


Laboratory Tests


6/29/22 05:31














A/P-Cardiology


Admission Diagnosis


Atrial fibrillation


Hypotensive shock


Coronary artery disease


Congestive heart failure, acute on chronic left ventricular diastolic 

dysfunction





Assessment/Plan


Paroxysmal atrial fibrillation with rapid ventricular response


Converted back to sinus rhythm.  Doing well at this time.


She was treated with Cardizem bolus and a drip then it was discontinued due to 

bradycardia and hypotension.


Currently heart rate and blood pressure are stable.  Continue to monitor





Status post acute respiratory failure, significant shortness of breath,


Had congestive heart failure with acute left ventricular diastolic dysfunction, 

responded to aggressive diuresis


I will start her on oral Lasix 20 mg daily and potassium and monitor tolerance 

and response





Status post sepsis with septic shock, patient was on dopamine drip


Blood pressure is better, she is clinically more stable.  Continue to monitor





Status post hypotension secondary to sepsis and UTI, better at this time, 

receiving antibiotic





Coronary artery disease, history of multiple interventions in the past.


Cardiac catheterization done in 2012 by Dr. Gonsalez showing 90% in-stent 

restenosis in the LAD with successful balloon angioplasty.  She has 50% proximal

LAD stenosis and 50% proximal right coronary artery stenosis treated 

conservatively


Patient has moved back to our area recently.  Does not follow-up with a 

cardiologist.


I will discontinue Plavix and continue on aspirin and Eliquis





Hypertension, better control at this point.  Continue to monitor





History of bradycardia seen by Dr. Dominguez in the past.  Currently normal heart 

rate.





Diabetes mellitus, followed by primary care physician





Advanced dementia





Bilateral foot ulcers.  Wound care consults











CHARBEL GONSALEZ MD              Jun 29, 2022 10:15

## 2022-06-29 NOTE — PHYSICIAN QUERY CLARIFICATION
Physician Query-General


Query to Physician:


The medical record reflects the following clinical scenario:


The patient, in the setting of History/Risk factors, Chronic UTI's, DM, At. fib 

with RVR on admission. 


Clinical Findings  ,  RR 21, /74, SpO2 96% sat on 4 L T 36.3, WBC 

11.5,  glucose 310, lactic acid 5.20 then 2.57, BC X 2 from date of admission 

both Pos for Staph Coag Neg,  oxygen saturation was in the 70s on room air 

improved to 96 on 4 L patient uses 2 L oxygen but only at night, within 6 hours 

of arrival BP decreased to 83/72


Treatment ER: Cefepime IV, normal saline 2 L, Dopamine, 





Question:  Do you agree with the impression Sepsis with septic shock per Dr. NOLA Gonsalez? 





1. Yes; will document Sepsis with septic shock present on admission in the 

Progress Notes


2. No; will continue current documentation in the Progress Notes 


3. Other; will document explanation of clinical findings


4. Clinically undetermined; no explanation for clinical findings








Please clarify and document your clinical opinion in the Progress Notes and 

Discharge Summary including the definitive and/or presumptive diagnosis, 

(suspected or probable), related to the above clinical findings. Please include 

clinical findings supporting your diagnosis.


In responding to this query, please exercise your independent professional 

judgment.  The purpose of this communication is to more accurately reflect the 

complexity of your patients condition. The fact that a question is asked does 

not imply that any particular answer is desired or expected.  





Thank you for timely response to this clarification.   


      


Calli Ramos MSN, RN


Clinical 


[phone]


[email or web address]





PHYSICIAN RESPONSE:


Based on the clinical findings in the record, please respond to the query above 

on this document as an addendum. 








Physician Response:


Physician Response


1














If you have questions please contact:


                   


:


Ext:





Thank you for your time and cooperation.


Clinical /








*********************This is a permanent part of the medical 

record*******************











CALLI RAMOS                   Jun 29, 2022 18:39


MENA NAVARRETE MD               Jul 3, 2022 12:14

## 2022-06-29 NOTE — DISCHARGE SUMMARY
Diagnosis/Chief Complaint


Date of Admission


Jun 23, 2022 at 15:15


Date of Discharge





Discharge Date:  Jun 29, 2022


Admission Diagnosis


AFib with RVR


Primary Care





Discharge Diagnosis





(1) Shock


Status:  Resolved


(2) Atrial fibrillation with rapid ventricular response


Status:  Resolved


(3) Acute on chronic respiratory failure with hypoxemia


Status:  Acute


(4) HFrEF (heart failure with reduced ejection fraction)


Status:  Chronic


(5) CAD (coronary artery disease)


Status:  Chronic


(6) T2DM (type 2 diabetes mellitus)


Status:  Chronic


(7) HTN (hypertension)


Status:  Chronic


(8) HLD (hyperlipidemia)


Status:  Chronic


(9) GERD (gastroesophageal reflux disease)


Status:  Chronic


(10) Obesity


Status:  Chronic


(11) Wound of foot


Status:  Chronic





Discharge Summary


Discharge Physical Exam


Allergies:  


Coded Allergies:  


     diazepam (Unverified  Adverse Reaction, Unknown, 8/10/19)


Vitals & I&Os





General Appearance:  No Apparent Distress, Chronically ill, Obese


Cardiovascular:  Irregularly Irregular


Neurologic/Psychiatric:  Alert, Disoriented





Hospital Course


Patient was admitted to the hospital secondary to atrial fibrillation with rapid

ventricular rate.  She was treated with IV Cardizem.  Cardiology was consulted. 

She had mixed cardiogenic and hypovolemic shock and also necessitated dopamine 

but was able to be weaned off of this.  She was set for discharge 2 days prior 

to discharge but had an episode of flash pulmonary edema and responded very well

to Lasix.  She was monitored for another day and did very well without further 

instances of respiratory distress.  She was able to be discharged home in stable

improved condition to follow-up with Dr. Gonsalez and her primary care physician 

Dr. Bustillo.


Labs (last 24 hrs)





Microbiology


6/23/22 Urine Culture - Final, Complete


          NO GROWTH


6/23/22 Blood Culture - Final, Complete


          Staph, Coag Neg (CNS)


Patient resulted labs reviewed.


Pending Labs





Imaging:  Reviewed Imaging Films, Reviewed Imaging Report





Discussion & Recommendations


Discharge Planning:  >30 minutes discharge planning





Discharge


Home Medications:





Active Scripts


Active


Amox Tr-K Clv 875-125 mg Tab (Amoxicillin/Potassium Clav) 875 Mg-125 Mg Tablet 

875 Mg PO BID WITH MEALS


Eliquis (Apixaban) 5 Mg Tablet 5 Mg PO BID


Levemir (Insulin Determir) 1,000 Units/10 Ml Soln 20 Units SQ DAILY


Reported


Tramadol HCl 50 Mg Tablet 50 Mg PO Q8H PRN


Neomycin-Polymyxin-Hc Ear Soln (Neomycin/Polymyxin B Sulf/Hc) 3.5 Mg/Ml-10,000 

Unit/Ml-1 % Solution 3 Drops EACH EAR UD PRN


Ondansetron HCl 4 Mg Tablet 4 Mg PO Q6H PRN


Furosemide 40 Mg Tablet 40 Mg PO DAILY PRN


Calmoseptine Ointment (Menthol/Lanolin/Calamine/Znox) 0.44 %-20.6 % Oint 1 

Applic TP BID


     APPLY TO BUTTOCKS


Miralax (Polyethylene Glycol 3350) 17 Gram Powd.pack 17 Gm PO DAILY


Nystatin 100,000 Unit/Gram Powder 1 Applic TOP 0900,1400,1900


     APPLY TO ABDOMINAL FOLDS


Memantine HCl 5 Mg Tablet 5 Mg PO Q12H


Buspirone HCl 5 Mg Tablet 5 Mg PO 0800,1700


Omeprazole 20 Mg Capsule.dr 20 Mg PO DAILY


Multivitamin 1 Each Tablet 1 Each PO DAILY


Atorvastatin Calcium 40 Mg Tablet 40 Mg PO HS


Neurontin (Gabapentin) 300 Mg Capsule 300 Mg PO HS


Morphine Sulfate ER (Morphine Sulfate) 30 Mg Tablet.er 30 Mg PO BID


Aspirin EC (Aspirin) 81 Mg Tablet.dr 81 Mg PO DAILY


Acetaminophen 325 Mg Tablet 650 Mg PO TID


Milk of Magnesia (Magnesium Hydroxide) 400 Mg/5 Ml Oral.susp 30 Ml PO DAILY PRN


Klor-Con 10 (Potassium Chloride) 10 Meq Tablet.er 10 Meq PO DAILY


Gabapentin 100 Mg Capsule 100 Mg PO 0800,1400


Colace (Docusate Sodium) 100 Mg Capsule 100 Mg PO 0800,1700


Clopidogrel (Clopidogrel Bisulfate) 75 Mg Tablet 75 Mg PO DAILY


Paroxetine HCl 30 Mg Tablet 30 Mg PO DAILY


Furosemide 40 Mg Tablet 40 Mg PO DAILY





Instructions to patient/family


Please see electronic discharge instructions given to patient.











MENA NAVARRETE MD              Jun 29, 2022 13:52

## 2022-07-28 ENCOUNTER — HOSPITAL ENCOUNTER (EMERGENCY)
Dept: HOSPITAL 75 - ER FS | Age: 77
Discharge: NURSING, REHAB OR LONG-TERM CARE | End: 2022-07-28
Payer: MEDICARE

## 2022-07-28 VITALS — BODY MASS INDEX: 35.67 KG/M2 | WEIGHT: 188.94 LBS | HEIGHT: 60.98 IN

## 2022-07-28 VITALS — SYSTOLIC BLOOD PRESSURE: 421 MMHG | DIASTOLIC BLOOD PRESSURE: 71 MMHG

## 2022-07-28 DIAGNOSIS — N39.0: ICD-10-CM

## 2022-07-28 DIAGNOSIS — R74.02: ICD-10-CM

## 2022-07-28 DIAGNOSIS — F03.90: ICD-10-CM

## 2022-07-28 DIAGNOSIS — Z74.01: ICD-10-CM

## 2022-07-28 DIAGNOSIS — E11.9: ICD-10-CM

## 2022-07-28 DIAGNOSIS — Z20.822: ICD-10-CM

## 2022-07-28 DIAGNOSIS — Z79.4: ICD-10-CM

## 2022-07-28 DIAGNOSIS — I50.9: ICD-10-CM

## 2022-07-28 DIAGNOSIS — L89.159: ICD-10-CM

## 2022-07-28 DIAGNOSIS — I11.0: Primary | ICD-10-CM

## 2022-07-28 DIAGNOSIS — Z99.81: ICD-10-CM

## 2022-07-28 DIAGNOSIS — I48.20: ICD-10-CM

## 2022-07-28 LAB
ALBUMIN SERPL-MCNC: 3.2 GM/DL (ref 3.2–4.5)
ALP SERPL-CCNC: 167 U/L (ref 40–136)
ALT SERPL-CCNC: 10 U/L (ref 0–55)
APTT BLD: 41 SEC (ref 24–35)
APTT PPP: YELLOW S
BACTERIA #/AREA URNS HPF: (no result) /HPF
BASE EXCESS STD BLDA CALC-SCNC: 12.2 MMOL/L (ref -2.5–2.5)
BASOPHILS # BLD AUTO: 0 10^3/UL (ref 0–0.1)
BASOPHILS NFR BLD AUTO: 0 % (ref 0–10)
BDY SITE: (no result)
BILIRUB SERPL-MCNC: 0.4 MG/DL (ref 0.1–1)
BILIRUB UR QL STRIP: NEGATIVE
BODY TEMPERATURE: 98.8
BUN/CREAT SERPL: 19
CALCIUM SERPL-MCNC: 9 MG/DL (ref 8.5–10.1)
CHLORIDE SERPL-SCNC: 96 MMOL/L (ref 98–107)
CO2 BLDA CALC-SCNC: 39.4 MMOL/L (ref 21–31)
CO2 SERPL-SCNC: 34 MMOL/L (ref 21–32)
CREAT SERPL-MCNC: 0.67 MG/DL (ref 0.6–1.3)
D DIMER PPP FEU-MCNC: 0.79 UG/ML (ref 0–0.49)
EOSINOPHIL # BLD AUTO: 0.2 10^3/UL (ref 0–0.3)
EOSINOPHIL NFR BLD AUTO: 2 % (ref 0–10)
FIBRINOGEN PPP-MCNC: (no result) MG/DL
GFR SERPLBLD BASED ON 1.73 SQ M-ARVRAT: 91 ML/MIN
GLUCOSE SERPL-MCNC: 255 MG/DL (ref 70–105)
GLUCOSE UR STRIP-MCNC: (no result) MG/DL
HCT VFR BLD CALC: 31 % (ref 35–52)
HGB BLD-MCNC: 9.1 G/DL (ref 11.5–16)
INHALED O2 FLOW RATE: (no result) L/MIN
INR PPP: 1.1 (ref 0.8–1.4)
KETONES UR QL STRIP: NEGATIVE
LEUKOCYTE ESTERASE UR QL STRIP: (no result)
LYMPHOCYTES # BLD AUTO: 3.3 10^3/UL (ref 1–4)
LYMPHOCYTES NFR BLD AUTO: 34 % (ref 12–44)
MAGNESIUM SERPL-MCNC: 1.5 MG/DL (ref 1.6–2.4)
MANUAL DIFFERENTIAL PERFORMED BLD QL: NO
MCH RBC QN AUTO: 25 PG (ref 25–34)
MCHC RBC AUTO-ENTMCNC: 29 G/DL (ref 32–36)
MCV RBC AUTO: 85 FL (ref 80–99)
MONOCYTES # BLD AUTO: 0.6 10^3/UL (ref 0–1)
MONOCYTES NFR BLD AUTO: 7 % (ref 0–12)
NEUTROPHILS # BLD AUTO: 5.6 10^3/UL (ref 1.8–7.8)
NEUTROPHILS NFR BLD AUTO: 57 % (ref 42–75)
NITRITE UR QL STRIP: POSITIVE
PCO2 BLDA: 52 MMHG (ref 35–45)
PH BLDA: 7.47 [PH] (ref 7.37–7.43)
PH UR STRIP: 6.5 [PH] (ref 5–9)
PLATELET # BLD: 481 10^3/UL (ref 130–400)
PMV BLD AUTO: 9 FL (ref 9–12.2)
PO2 BLDA: 106 MMHG (ref 79–93)
POTASSIUM SERPL-SCNC: 4 MMOL/L (ref 3.6–5)
PROT SERPL-MCNC: 6.9 GM/DL (ref 6.4–8.2)
PROT UR QL STRIP: NEGATIVE
PROTHROMBIN TIME: 15.1 SEC (ref 12.2–14.7)
RBC #/AREA URNS HPF: (no result) /HPF
SAO2 % BLDA FROM PO2: 98 % (ref 94–100)
SODIUM SERPL-SCNC: 139 MMOL/L (ref 135–145)
SP GR UR STRIP: 1.01 (ref 1.02–1.02)
VENTILATION MODE VENT: NO
WBC # BLD AUTO: 9.8 10^3/UL (ref 4.3–11)
WBC #/AREA URNS HPF: >100 /HPF

## 2022-07-28 PROCEDURE — 93041 RHYTHM ECG TRACING: CPT

## 2022-07-28 PROCEDURE — 82805 BLOOD GASES W/O2 SATURATION: CPT

## 2022-07-28 PROCEDURE — 83880 ASSAY OF NATRIURETIC PEPTIDE: CPT

## 2022-07-28 PROCEDURE — 85379 FIBRIN DEGRADATION QUANT: CPT

## 2022-07-28 PROCEDURE — 87088 URINE BACTERIA CULTURE: CPT

## 2022-07-28 PROCEDURE — 85025 COMPLETE CBC W/AUTO DIFF WBC: CPT

## 2022-07-28 PROCEDURE — 83735 ASSAY OF MAGNESIUM: CPT

## 2022-07-28 PROCEDURE — 36415 COLL VENOUS BLD VENIPUNCTURE: CPT

## 2022-07-28 PROCEDURE — 85610 PROTHROMBIN TIME: CPT

## 2022-07-28 PROCEDURE — 51702 INSERT TEMP BLADDER CATH: CPT

## 2022-07-28 PROCEDURE — 83605 ASSAY OF LACTIC ACID: CPT

## 2022-07-28 PROCEDURE — 87636 SARSCOV2 & INF A&B AMP PRB: CPT

## 2022-07-28 PROCEDURE — 87077 CULTURE AEROBIC IDENTIFY: CPT

## 2022-07-28 PROCEDURE — 87186 SC STD MICRODIL/AGAR DIL: CPT

## 2022-07-28 PROCEDURE — 80053 COMPREHEN METABOLIC PANEL: CPT

## 2022-07-28 PROCEDURE — 81000 URINALYSIS NONAUTO W/SCOPE: CPT

## 2022-07-28 PROCEDURE — 85730 THROMBOPLASTIN TIME PARTIAL: CPT

## 2022-07-28 PROCEDURE — 87040 BLOOD CULTURE FOR BACTERIA: CPT

## 2022-07-28 PROCEDURE — 71045 X-RAY EXAM CHEST 1 VIEW: CPT

## 2022-07-28 PROCEDURE — 84484 ASSAY OF TROPONIN QUANT: CPT

## 2022-07-28 NOTE — ED RESPIRATORY
General


Chief Complaint:  Respiratory Problems


Stated Complaint:  SOA


Nursing Triage Note:  


Staff at [facility] states patient became short of breath 1 hour prior 


to arrival to the ED. Staff reports patient's blood sugar was 213, , B/P 


219/124.


Source:  EMS, EMS notes reviewed, nursing home records


Exam Limitations:  physical impairment





History of Present Illness


Date Seen by Provider:  2022


Time Seen by Provider:  18:08


Initial Comments


77-year-old bedridden female patient resident of nursing home with history of 

dyslipidemia, hypertension, CVA, UTI, GERD, arthritis, diabetes mellitus, 

dementia, CHF brought in by EMS because of shortness of breath.  Nursing home 

reported that patient had sudden onset of shortness of breath 1 hour prior to 

arrival with blood sugar of 213, heart rate of 124, blood pressure of 219/124.  

MS gave patient DuoNeb on route.  Patient is on 4 L of oxygen and had O2 sat of 

99% at arrival to ER.  Patient has dementia and is alert and oriented x1 and 

denies any problem.





Allergies and Home Medications


Allergies


Coded Allergies:  


     diazepam (Unverified  Adverse Reaction, Unknown, 8/10/19)





Patient Home Medication List


Home Medication List Reviewed:  Yes


Acetaminophen (Acetaminophen) 325 Mg Tablet, 650 MG PO TID, (Reported)


   Entered as Reported by: KISHA OTT on 22 1031


Amoxicillin/Potassium Clav (Amox Tr-K Clv 875-125 mg Tab) 875 Mg-125 Mg Tablet, 

875 MG PO BID WITH MEALS


   Prescribed by: MENA NAVARRETE on 22 1215


Apixaban (Eliquis) 5 Mg Tablet, 5 MG PO BID


   Prescribed by: MENA NAVARRETE on 22 1215


Aspirin (Aspirin EC) 81 Mg Tablet.dr, 81 MG PO DAILY, (Reported)


   Entered as Reported by: KISHA OTT on 22 1031


Atorvastatin Calcium (Atorvastatin Calcium) 40 Mg Tablet, 40 MG PO HS, 

(Reported)


   Entered as Reported by: KISHA OTT on 22 1032


Buspirone HCl (Buspirone HCl) 5 Mg Tablet, 5 MG PO 0800,1700, (Reported)


   Entered as Reported by: KISHA OTT on 22 1032


Cephalexin (Cephalexin) 500 Mg Tablet, 500 MG PO TID


   Prescribed by: Rosy cornelius on 22 210


Clopidogrel Bisulfate (Clopidogrel) 75 Mg Tablet, 75 MG PO DAILY, (Reported)


   Entered as Reported by: DONA HARRISON on 8/10/19 1025


Docusate Sodium (Colace) 100 Mg Capsule, 100 MG PO 0800,1700, (Reported)


   Entered as Reported by: DONA HARRISON on 8/10/19 1025


Furosemide (Furosemide) 40 Mg Tablet, 40 MG PO DAILY, (Reported)


   Entered as Reported by: DONA HARRISON on 8/10/19 1016


Furosemide (Furosemide) 40 Mg Tablet, 40 MG PO DAILY PRN for SHORTNESS OF 

BREATH, (Reported)


   Entered as Reported by: KISHA OTT on 22 1032


Gabapentin (Gabapentin) 100 Mg Capsule, 100 MG PO 0800,1400, (Reported)


   Entered as Reported by: DONA HARRISON on 8/10/19 1025


Gabapentin (Neurontin) 300 Mg Capsule, 300 MG PO HS, (Reported)


   Entered as Reported by: KISHA OTT on 22 1032


Insulin Determir (Levemir) 1,000 Units/10 Ml Soln, 20 UNITS SQ DAILY


   Prescribed by: MENA NAVARRETE on 22 1215


Magnesium Hydroxide (Milk of Magnesia) 400 Mg/5 Ml Oral.susp, 30 ML PO DAILY PRN

for CONSTIPATION-7TH LINE, (Reported)


   Entered as Reported by: DONA HARRISON on 8/10/19 1045


Memantine HCl (Memantine HCl) 5 Mg Tablet, 5 MG PO Q12H, (Reported)


   Entered as Reported by: KISHA OTT on 22 1032


Menthol/Lanolin/Calamine/Znox (Calmoseptine Ointment) 0.44 %-20.6 % Oint, 1 

APPLIC TP BID, (Reported)


   Entered as Reported by: KISHA OTT on 22 1032


Morphine Sulfate (Morphine Sulfate ER) 30 Mg Tablet.er, 30 MG PO BID, (Reported)


   Entered as Reported by: KISHA OTT on 22 1032


Multivitamin (Multivitamin) 1 Each Tablet, 1 EACH PO DAILY, (Reported)


   Entered as Reported by: KISHA OTT on 22 1032


Neomycin/Polymyxin B Sulf/Hc (Neomycin-Polymyxin-Hc Ear Soln) 3.5 Mg/Ml-10,000 

Unit/Ml-1 % Solution, 3 DROPS EACH EAR UD PRN for EAR DRAINAGE, (Reported)


   Entered as Reported by: KISHA OTT on 22 1032


Nystatin (Nystatin) 100,000 Unit/Gram Powder, 1 APPLIC TOP 0900,1400,1900, 

(Reported)


   Entered as Reported by: KISHA OTT on 22 1032


Omeprazole (Omeprazole) 20 Mg Capsule.dr, 20 MG PO DAILY, (Reported)


   Entered as Reported by: KISHA OTT on 22 1032


Ondansetron HCl (Ondansetron HCl) 4 Mg Tablet, 4 MG PO Q6H PRN for 

NAUSEA/VOMITING-1ST LINE, (Reported)


   Entered as Reported by: KISHA OTT on 22 1032


Paroxetine HCl (Paroxetine HCl) 30 Mg Tablet, 30 MG PO DAILY, (Reported)


   Entered as Reported by: DONA HARRISON on 8/10/19 1016


Polyethylene Glycol 3350 (Miralax) 17 Gram Powd.pack, 17 GM PO DAILY, (Reported)


   Entered as Reported by: KISHA OTT on 22 1032


Potassium Chloride (Klor-Con 10) 10 Meq Tablet.er, 10 MEQ PO DAILY, (Reported)


   Entered as Reported by: DONA HARRISON on 8/10/19 1025


Tramadol HCl (Tramadol HCl) 50 Mg Tablet, 50 MG PO Q8H PRN for PAIN-MODERATE (5-

7), (Reported)


   Entered as Reported by: KISHA OTT on 22 1032





Review of Systems


Review of Systems


Constitutional:  other (Patient has dementia and denies any symptoms.)


EENTM:  no symptoms reported


Respiratory:  no symptoms reported


Cardiovascular:  no symptoms reported


Gastrointestinal:  no symptoms reported


Genitourinary:  no symptoms reported


Musculoskeletal:  no symptoms reported


Skin:  no symptoms reported


Psychiatric/Neurological:  No Symptoms Reported


Hematologic/Lymphatic:  No Symptoms Reported


Immunological/Allergic:  no symptoms reported





Past Medical-Social-Family Hx


Patient Social History


Tobacco Use?:  No


Use of E-Cig and/or Vaping dev:  No


Substance use?:  No


Alcohol Use?:  No





Immunizations Up To Date


First/Initial COVID19 Vaccinat:  PT HAS BOTH BUT UNSURE OF WHICH KIND AND WHEN


Second COVID19 Vaccination Damir:  Yes





Seasonal Allergies


Seasonal Allergies:  No





Past Medical History


Surgery/Hospitalization HX:  


DM type 2; HTN; Depression; Anxiety; Ischemic heart disease; GERD; HTN; Chronic 


pain; Dysphagia; Dementia


Surgeries:  Yes (JOINT REPLACEMENT BOTH KNEES, BACK SURGERY)


Gallbladder, Joint Replacement, Orthopedic


Respiratory:  Yes (chronic SOA, wear O2 to keep > 92%)


Cardiac:  Yes (chronic ischemic heart dz, )


High Cholesterol, Hypertension


Neurological:  Yes


Stroke


Reproductive Disorders:  No


Genitourinary:  Yes


UTI-Chronic


Gastrointestinal:  Yes (Hx of dysphagia/aphasia post CVA)


Gastroesophageal Reflux


Musculoskeletal:  Yes (hx back surgery and knee replacements)


Arthritis


Endocrine:  Yes (DM Type II)


Diabetes, Insulin dep


HEENT:  No


Cancer:  No


Psychosocial:  Yes


Anxiety, Depression


Blood Disorders:  No





Family Medical History





Patient reports no known family medical history.


No Pertinent Family Hx





Physical Exam





Vital Signs - First Documented








 22





 18:10


 


Temp 37.1


 


Pulse 136


 


Resp 24


 


B/P (MAP) 147/71 (96)


 


Pulse Ox 99


 


O2 Delivery Nasal Cannula


 


O2 Flow Rate 2.00





Capillary Refill : Less Than 3 Seconds


Height: 5'1.00"


Weight: 225lbs. 0oz. [phone]; 35.00 BMI


Method:Estimated


General Appearance:  no apparent distress


Eyes:  Bilateral Eye Normal Inspection, Bilateral Eye PERRL, Bilateral Eye EOMI


HEENT:  PERRL/EOMI


Neck:  non-tender, full range of motion


Respiratory:  chest non-tender, lungs clear, normal breath sounds, no 

respiratory distress, no accessory muscle use, other (O2 sat of 99% on 4 L of 

home oxygen)


Cardiovascular:  tachycardia, irregularly irregular


Gastrointestinal:  non tender, soft


Extremities:  other (Bilateral extremity on heel pads, left upper extremity with

edema and weakness)


Neurologic/Psychiatric:  alert, other (Oriented times 1)


Skin:  other (Pressure sore of sacral area covered with wound clinic dressing)





Focused Exam


Lactate Level


22 18:10: Lactic Acid Level 2.22*H


22 20:27: Lactic Acid Level 1.41





Lactic Acid Level





Laboratory Tests








Test


 22


18:10 22


20:27


 


Lactic Acid Level


 2.22 MMOL/L


(0.50-2.00)  *H 1.41 MMOL/L


(0.50-2.00)











Progress/Results/Core Measures


Suspected Sepsis


SIRS


Temperature: 


Pulse: 136 


Respiratory Rate: 24


 


Laboratory Tests


22 18:10: White Blood Count 9.8


Blood Pressure 147 /71 


Mean: 96


 


22 18:10: Lactic Acid Level 2.22*H


22 20:27: Lactic Acid Level 1.41


Laboratory Tests


22 18:10: 


Creatinine 0.67, INR Comment 1.1, Platelet Count 481H, Total Bilirubin 0.4








Results/Orders


Lab Results





Laboratory Tests








Test


 22


18:10 22


18:32 22


20:11 22


20:27 Range/Units


 


 


White Blood Count


 9.8 


 


 


 


 4.3-11.0


10^3/uL


 


Red Blood Count


 3.68 L


 


 


 


 3.80-5.11


10^6/uL


 


Hemoglobin 9.1 L    11.5-16.0  g/dL


 


Hematocrit 31 L    35-52  %


 


Mean Corpuscular Volume 85     80-99  fL


 


Mean Corpuscular Hemoglobin 25     25-34  pg


 


Mean Corpuscular Hemoglobin


Concent 29 L


 


 


 


 32-36  g/dL





 


Red Cell Distribution Width 15.7 H    10.0-14.5  %


 


Platelet Count


 481 H


 


 


 


 130-400


10^3/uL


 


Mean Platelet Volume 9.0     9.0-12.2  fL


 


Immature Granulocyte % (Auto) 0      %


 


Neutrophils (%) (Auto) 57     42-75  %


 


Lymphocytes (%) (Auto) 34     12-44  %


 


Monocytes (%) (Auto) 7     0-12  %


 


Eosinophils (%) (Auto) 2     0-10  %


 


Basophils (%) (Auto) 0     0-10  %


 


Neutrophils # (Auto)


 5.6 


 


 


 


 1.8-7.8


10^3/uL


 


Lymphocytes # (Auto)


 3.3 


 


 


 


 1.0-4.0


10^3/uL


 


Monocytes # (Auto)


 0.6 


 


 


 


 0.0-1.0


10^3/uL


 


Eosinophils # (Auto)


 0.2 


 


 


 


 0.0-0.3


10^3/uL


 


Basophils # (Auto)


 0.0 


 


 


 


 0.0-0.1


10^3/uL


 


Immature Granulocyte # (Auto)


 0.0 


 


 


 


 0.0-0.1


10^3/uL


 


Prothrombin Time 15.1 H    12.2-14.7  SEC


 


INR Comment 1.1     0.8-1.4  


 


Activated Partial


Thromboplast Time 41 H


 


 


 


 24-35  SEC





 


D-Dimer


 0.79 H


 


 


 


 0.00-0.49


UG/ML


 


Sodium Level 139     135-145  MMOL/L


 


Potassium Level 4.0     3.6-5.0  MMOL/L


 


Chloride Level 96 L      MMOL/L


 


Carbon Dioxide Level 34 H    21-32  MMOL/L


 


Anion Gap 9     5-14  MMOL/L


 


Blood Urea Nitrogen 13     7-18  MG/DL


 


Creatinine


 0.67 


 


 


 


 0.60-1.30


MG/DL


 


Estimat Glomerular Filtration


Rate 91 


 


 


 


  





 


BUN/Creatinine Ratio 19      


 


Glucose Level 255 H      MG/DL


 


Lactic Acid Level


 2.22 *H


 


 


 1.41 


 0.50-2.00


MMOL/L


 


Calcium Level 9.0     8.5-10.1  MG/DL


 


Corrected Calcium 9.6     8.5-10.1  MG/DL


 


Magnesium Level 1.5 L    1.6-2.4  MG/DL


 


Total Bilirubin 0.4     0.1-1.0  MG/DL


 


Aspartate Amino Transf


(AST/SGOT) 15 


 


 


 


 5-34  U/L





 


Alanine Aminotransferase


(ALT/SGPT) 10 


 


 


 


 0-55  U/L





 


Alkaline Phosphatase 167 H      U/L


 


Troponin I < 0.30     <0.30  NG/ML


 


Pro-B-Type Natriuretic Peptide 3520.0 H    <450.0  PG/ML


 


Total Protein 6.9     6.4-8.2  GM/DL


 


Albumin 3.2     3.2-4.5  GM/DL


 


SARS-CoV-2 RNA (RT-PCR) Not Detected     Not Detecte  


 


Blood Gas Puncture Site  RT WRIST     


 


Blood Gas Patient Temperature  98.8     


 


Arterial Blood pH  7.47 H   7.37-7.43  


 


Arterial Blood Partial


Pressure CO2 


 52 H


 


 


 35-45  MMHG





 


Arterial Blood Partial


Pressure O2 


 106 H


 


 


 79-93  MMHG





 


Arterial Blood HCO3  38 H   23-27  MMOL/L


 


Arterial Blood Total CO2


 


 39.4 H


 


 


 21.0-31.0


MMOL/L


 


Arterial Blood Oxygen


Saturation 


 98 


 


 


   %





 


Arterial Blood Base Excess


 


 12.2 H


 


 


 -2.5-2.5


MMOL/L


 


Cornel Test  NA     


 


Blood Gas Ventilator Setting  NO     


 


Blood Gas Inspired Oxygen  2 L     


 


Urine Color   YELLOW    


 


Urine Clarity   CLOUDY    


 


Urine pH   6.5   5-9  


 


Urine Specific Gravity   1.015 L  1.016-1.022  


 


Urine Protein   NEGATIVE   NEGATIVE  


 


Urine Glucose (UA)   TRACE H  NEGATIVE  


 


Urine Ketones   NEGATIVE   NEGATIVE  


 


Urine Nitrite   POSITIVE H  NEGATIVE  


 


Urine Bilirubin   NEGATIVE   NEGATIVE  


 


Urine Urobilinogen   0.2   < = 1.0  MG/DL


 


Urine Leukocyte Esterase   3+ H  NEGATIVE  


 


Urine RBC (Auto)   TRACE-I H  NEGATIVE  


 


Urine RBC   2-5 H   /HPF


 


Urine WBC   >100 H   /HPF


 


Urine Squamous Epithelial


Cells 


 


 10-25 H


 


  /HPF





 


Urine Crystals   NONE    /LPF


 


Urine Bacteria   LARGE H   /HPF


 


Urine Casts   NONE    /LPF


 


Urine Mucus   SMALL H   /LPF


 


Urine Culture Indicated   YES    








My Orders





Orders - ROSY CORNELIUS MD


Cbc With Automated Diff (22 18:24)


Comprehensive Metabolic Panel (22 18:24)


Blood Culture (22 18:24)


Chest 1 View Ap/Pa Only (22 18:24)


Fibrin Degradation Products (22 18:24)


Magnesium (22 18:24)


Ekg Tracing (22 18:24)


O2 (22 18:24)


Ed Iv/Invasive Line Start (22 18:24)


Monitor-Rhythm Ecg Trace Only (22 18:24)


Lactic Acid Analyzer (22 18:24)


Troponin I Davide (22 18:24)


Arterial Blood Gas (22 18:24)


Covid 19 Inhouse Test (22 18:24)


Urinalysis (22 18:32)


Protime With Inr (22 18:32)


Partial Thromboplastin Time (22 18:32)


Probnp Fs (22 18:24)


Furosemide  Injection (Lasix  Injection) (22 19:45)


Ceftriaxone 1 Gm Pre-Mix (Rocephin 1 Gm (22 20:15)


Lactic Acid Analyzer (22 20:24)


Urine Culture (22 20:11)





Medications Given in ED





Current Medications








 Medications  Dose


 Ordered  Sig/Johnson


 Route  Start Time


 Stop Time Status Last Admin


Dose Admin


 


 Ceftriaxone


 Sodium/Dextrose  50 ml @ 


 100 mls/hr  ONCE  ONCE


 IV  22 20:15


 7/28/22 20:44 DC 22 20:26


100 MLS/HR


 


 Furosemide  20 mg  ONCE  ONCE


 IVP  22 19:45


 22 19:46 DC 22 20:26


20 MG








Vital Signs/I&O











 22





 18:10 18:33 20:59


 


Temp 37.1  


 


Pulse 136  86


 


Resp 24  18


 


B/P (MAP) 147/71 (96)  421/71


 


Pulse Ox 99  98


 


O2 Delivery Nasal Cannula Nasal Cannula Room Air


 


O2 Flow Rate 2.00 4.00 





Capillary Refill : Less Than 3 Seconds








Blood Pressure Mean:                    96








Progress Note :  


Progress Note


Evaluation of patient in ER showed 77-year-old female DM patient bedridden 

resident of nursing home brought in with sudden onset of shortness of breath.  

Patient had heart rate of 120s per EMS report and at arrival to ER had heart rat

e of 105 with atrial fibrillation that gradually decreased to 80s with atrial 

fibrillation with history of chronic atrial fibrillation that was most likely 

the result of her shortness of breath.  Patient also had history of CHF with as 

needed Lasix order at nursing home but did not have Lasix after her shortness of

breath episode.  Patient treated with 20 mg of Lasix in ER.  Patient had covered

with stool in sacral area with wound clinic dressing and also had bilateral 

heels in heel pad.  Patient had mild elevation of lactic acid at 2.2 at arrival 

and repeated  one was 1.4.  Patient had negative COVID test.  Urine showed UTI 

and patient treated with 1 g of Rocephin in ER and prescription for Keflex for 

nursing home was given.  Patient had O2 sat of 99% at her home 4 L of oxygen and

her condition improved and did not have episode of atrial fibrillation with RVR.

 Plan to discharge patient to nursing home with instruction to give her Lasix as

needed.  Shortness of breath and prescription of Keflex for UTI.  Patient's 

daughter informed about test results, ER course and plan of care and return to 

nursing home.  Patient has dementia and is oriented x1 and was calm and 

cooperative during ER treatment.





ECG


Initial ECG Impression Date:  2022


Initial ECG Impression Time:  18:23


Initial ECG Intervals


Atrial fibrillation at rate of 105 without acute ST and T wave elevation, 

multiple artifact.


Initial ECG Impression:  Atrial Fibrillation





Diagnostic Imaging





   Plain Films/CT/US/NM/MRI:  chest


Comments


Chest x-ray interpreted by radiologist and reviewed by me and showed:





NAME:   THEA MORRISON


[facility] REC#:   [id number]


ACCOUNT#:   [id number]


PT STATUS:   [facility] ER


:   1945


PHYSICIAN:   ROSY CORNELIUS MD


ADMIT DATE:   22/ER FS


                                  ***Signed***


Date of Exam:22





CHEST 1 VIEW AP/PA ONLY








CLINICAL INDICATION: Patient with shortness of breath.





EXAM: Portable chest x-ray upright view.





COMPARISON: Chest x-ray dated 2022.





FINDINGS:





Lungs/pleura: There is interval improved aeration of both lungs.


Lungs are now clear. There is no pneumothorax. There is no


pleural effusion.





Mediastinum: Unremarkable. 





Pulmonary vasculature: Unremarkable.





Heart: Cardiac silhouette is mildly enlarged, stable..





Bones/extrathoracic soft tissue: There are degenerative spurs


involving the thoracic spine.





IMPRESSION:


1: There is no radiographic evidence of acute cardiopulmonary


process. There is interval improved aeration of both lungs


compared to the prior study.





2: Stable cardiomegaly with no significant pulmonary vascular


congestion.





Dictated by: 





  Dictated on workstation # [id number]








Dict:   22


Trans:   22


CVB [id number]





Interpreted by:     YAMIL CORBIN MD


Electronically signed by: YAMIL CORBIN MD 22





Critical Care Note


Critical Care


Total Time (minutes)


45





Departure


Impression





   Primary Impression:  


   Atrial fibrillation with rapid ventricular response


   Additional Impressions:  


   CHF (congestive heart failure)


   Qualified Codes:  I50.9 - Heart failure, unspecified


   Dyspnea


   Qualified Codes:  R06.00 - Dyspnea, unspecified


   Dementia


   Qualified Codes:  F03.90 - Unspecified dementia without behavioral 

   disturbance


   Urinary tract infection


   Qualified Codes:  N39.0 - Urinary tract infection, site not specified


   Bedbound


   Sacral pressure sore


   Qualified Codes:  L89.159 - Pressure ulcer of sacral region, unspecified 

   stage


Disposition:  03 XFER SNF


Condition:  Improved





Departure-Patient Inst.


Referrals:  


AUSTIN WELCH MD (PCP/Family)


Primary Care Physician


Patient Instructions:  Atrial Fibrillation, CHF, Urinary Tract Infections in 

Adults





Add. Discharge Instructions:  


Take home medication Lasix as needed for shortness of breath and heart failure


Follow-up with your primary care physician in 2 to 3 days


Follow-up with wound clinic for pressure sore





All discharge instructions reviewed with patient and/or family. Voiced 

understanding.


Scripts


Cephalexin (Cephalexin) 500 Mg Tablet


500 MG PO TID, #21 TAB


   Prov: ROSY CORNELIUS MD         22











ROSY CORNELIUS MD             2022 20:12

## 2022-07-28 NOTE — DIAGNOSTIC IMAGING REPORT
CLINICAL INDICATION: Patient with shortness of breath.



EXAM: Portable chest x-ray upright view.



COMPARISON: Chest x-ray dated 06/27/2022.



FINDINGS:



Lungs/pleura: There is interval improved aeration of both lungs.

Lungs are now clear. There is no pneumothorax. There is no

pleural effusion.



Mediastinum: Unremarkable. 



Pulmonary vasculature: Unremarkable.



Heart: Cardiac silhouette is mildly enlarged, stable..



Bones/extrathoracic soft tissue: There are degenerative spurs

involving the thoracic spine.



IMPRESSION:

1: There is no radiographic evidence of acute cardiopulmonary

process. There is interval improved aeration of both lungs

compared to the prior study.



2: Stable cardiomegaly with no significant pulmonary vascular

congestion.



Dictated by: 



  Dictated on workstation # [id number]

## 2024-06-01 NOTE — DIAGNOSTIC IMAGING REPORT
HISTORY: Fever, nausea and vomiting.



TECHNIQUE: Frontal view of the chest. Frontal, supine and

decubitus views of the abdomen.



COMPARISON: 07/12/2020



FINDINGS:



Lung volumes are mildly low. No focal consolidation is seen.

There is no pleural effusion or pneumothorax. There is stable

cardiomegaly.



Overall there is paucity of bowel gas, but no bowel gas

distention is seen. No large collection of free air is seen.

Fusion hardware is seen in the lumbar spine. Cholecystectomy

clips are seen in the right upper quadrant. There are

degenerative changes in the bilateral hips and spine.



IMPRESSION:

1. Low lung volumes with no acute pulmonary abnormality seen.

Stable cardiomegaly.

2. No bowel obstruction or large collection of free air.



Dictated by: 



  Dictated on workstation # [id number]
PROCEDURE: CT abdomen and pelvis with contrast.



TECHNIQUE: Multiple contiguous axial images were obtained through

the abdomen and pelvis after administration of intravenous

contrast. Auto Exposure Controls were utilized during the CT exam

to meet ALARA standards for radiation dose reduction. All CT

scans use one or more of the following dose optimizing

techniques: automated exposure control, MA and/or KvP adjustment

based on patient size and exam type or iterative reconstruction.



INDICATION:  Nausea and vomiting. Fever.



COMPARISON: None.



FINDINGS: Included portions of the lung bases are clear. Note is

made of enlargement of the main pulmonary arterial trunk and

measures 3.3 cm in diameter. Moderate calcified coronary

atherosclerosis is also present.



CT ABDOMEN: There are a few scattered colonic diverticuli, but no

CT evidence of acute diverticulitis. There is somewhat thickened

appearance of the wall of the colon at the hepatic flexure. This

portion of the colonic wall measures 7 mm. Note is also made of

incomplete distention of this portion of the colon (image 31,

series 2). Normal appendix is identified.



Multiple subcentimeter hypoenhancing bilateral renal foci are

noted and may be on the basis of small cysts, but are too small

to adequately characterize based on this exam. Small nodular

density of the left adrenal gland is also identified, but is too

small to adequately characterize. Benign cyst is also noted

within the dome of segment II of the liver. Otherwise, the

kidneys, adrenal glands, spleen, pancreas, and liver have a

normal CT appearance.



There is no loculated fluid collection, free fluid or free air

within the abdomen. No abnormal mesenteric or retroperitoneal

adenopathy is seen. There is moderate diffuse calcified aortic

and arterial atherosclerosis. Osseous structures show age-related

and postsurgical changes.



CT PELVIS: There is nodular filling defect within the lumen of

the endometrial canal that measures 1.9 cm. This is surrounded by

gas (image 68, series 2). Small amount of fluid is also noted

within the vagina. Superior to the fundus of the uterus is a

complex probable multiseptated/loculated cystic fluid collection.

It measures 16.4 x 12.5 x 9.5 cm and appears to be associated

with the right ovary.



Multiple filling defects are noted suspended within the lumen of

the urinary bladder on the delayed phase.



There is no loculated fluid collection, free fluid or free air

within the pelvis. No abnormal adenopathy is seen. Osseous

structures show no acute abnormalities.



IMPRESSION:

1. Multiple genitourinary abnormalities are identified.



*  Nodular lesion within the lumen of the endometrial canal is

concerning for endometrial polyp/malignancy. Surgical

consultation is recommended.



*  Complex multiloculated/septated cystic lesion, which appears

to arise from the right ovary. While this may represent a benign

cystadenoma, carcinoma cannot be excluded. Again, surgical

consultation is advised.



*  Multiple filling defects are identified suspended within the

lumen of the urinary bladder. Findings could be on the basis of

hemorrhage. Underlying UTI cannot be excluded.

2. Thickened appearance of the wall of the colon and hepatic

flexure. This may be artifact and related to incomplete

distention. Further evaluation with colonoscopy or contrast enema

is recommended. This could be performed on a nonemergent basis.



Dictated by: 



  Dictated on workstation # [id number]
pt is here for abdominal pain.  pt stated that right side abdominal pain with diarrhea x 2 weeks, denied fever or chills, a/ox4, skin intact.